# Patient Record
Sex: FEMALE | Employment: FULL TIME | URBAN - METROPOLITAN AREA
[De-identification: names, ages, dates, MRNs, and addresses within clinical notes are randomized per-mention and may not be internally consistent; named-entity substitution may affect disease eponyms.]

---

## 2017-12-19 ENCOUNTER — HOSPITAL ENCOUNTER (OUTPATIENT)
Dept: MAMMOGRAPHY | Facility: CLINIC | Age: 52
Discharge: HOME | End: 2017-12-19
Attending: OBSTETRICS & GYNECOLOGY | Admitting: OBSTETRICS & GYNECOLOGY
Payer: COMMERCIAL

## 2017-12-19 DIAGNOSIS — Z12.31 VISIT FOR SCREENING MAMMOGRAM: ICD-10-CM

## 2017-12-19 PROCEDURE — 77063 BREAST TOMOSYNTHESIS BI: CPT

## 2017-12-19 PROCEDURE — G0202 SCR MAMMO BI INCL CAD: HCPCS

## 2018-12-21 ENCOUNTER — HOSPITAL ENCOUNTER (OUTPATIENT)
Dept: MAMMOGRAPHY | Facility: CLINIC | Age: 53
Discharge: HOME | End: 2018-12-21
Attending: FAMILY MEDICINE | Admitting: FAMILY MEDICINE
Payer: COMMERCIAL

## 2018-12-21 DIAGNOSIS — Z12.31 VISIT FOR SCREENING MAMMOGRAM: ICD-10-CM

## 2018-12-21 PROCEDURE — 77063 BREAST TOMOSYNTHESIS BI: CPT

## 2020-02-19 ENCOUNTER — HOSPITAL ENCOUNTER (OUTPATIENT)
Dept: MAMMOGRAPHY | Facility: CLINIC | Age: 55
Discharge: HOME | End: 2020-02-19
Attending: FAMILY MEDICINE | Admitting: FAMILY MEDICINE
Payer: COMMERCIAL

## 2020-02-19 DIAGNOSIS — Z12.31 VISIT FOR SCREENING MAMMOGRAM: ICD-10-CM

## 2020-02-19 PROCEDURE — 77063 BREAST TOMOSYNTHESIS BI: CPT

## 2021-01-15 ENCOUNTER — HEALTH MAINTENANCE LETTER (OUTPATIENT)
Age: 56
End: 2021-01-15

## 2021-09-04 ENCOUNTER — HEALTH MAINTENANCE LETTER (OUTPATIENT)
Age: 56
End: 2021-09-04

## 2022-02-19 ENCOUNTER — HEALTH MAINTENANCE LETTER (OUTPATIENT)
Age: 57
End: 2022-02-19

## 2022-06-11 ENCOUNTER — HEALTH MAINTENANCE LETTER (OUTPATIENT)
Age: 57
End: 2022-06-11

## 2022-10-16 ENCOUNTER — HEALTH MAINTENANCE LETTER (OUTPATIENT)
Age: 57
End: 2022-10-16

## 2023-04-01 ENCOUNTER — HEALTH MAINTENANCE LETTER (OUTPATIENT)
Age: 58
End: 2023-04-01

## 2023-04-20 ENCOUNTER — TRANSCRIBE ORDERS (OUTPATIENT)
Dept: OTHER | Age: 58
End: 2023-04-20

## 2023-04-20 DIAGNOSIS — G70.00 MYASTHENIA (H): Primary | ICD-10-CM

## 2023-04-20 DIAGNOSIS — E07.9 THYROID EYE DISEASE: ICD-10-CM

## 2023-04-20 DIAGNOSIS — H53.2 DIPLOPIA: ICD-10-CM

## 2023-04-20 DIAGNOSIS — H02.401 PTOSIS, RIGHT EYELID: ICD-10-CM

## 2023-04-20 DIAGNOSIS — H57.89 THYROID EYE DISEASE: ICD-10-CM

## 2023-08-29 ENCOUNTER — TRANSCRIBE ORDERS (OUTPATIENT)
Dept: OTHER | Age: 58
End: 2023-08-29

## 2023-08-29 DIAGNOSIS — H53.2 DIPLOPIA: Primary | ICD-10-CM

## 2023-09-05 ENCOUNTER — OFFICE VISIT (OUTPATIENT)
Dept: OPHTHALMOLOGY | Facility: CLINIC | Age: 58
End: 2023-09-05
Attending: OPHTHALMOLOGY
Payer: COMMERCIAL

## 2023-09-05 DIAGNOSIS — G70.00 MYASTHENIA (H): ICD-10-CM

## 2023-09-05 DIAGNOSIS — H53.2 DIPLOPIA: ICD-10-CM

## 2023-09-05 DIAGNOSIS — H50.00 ESOTROPIA, MONOCULAR: Primary | ICD-10-CM

## 2023-09-05 DIAGNOSIS — H02.401 PTOSIS, RIGHT EYELID: ICD-10-CM

## 2023-09-05 DIAGNOSIS — E07.9 THYROID EYE DISEASE: ICD-10-CM

## 2023-09-05 DIAGNOSIS — H57.89 THYROID EYE DISEASE: ICD-10-CM

## 2023-09-05 PROCEDURE — 92285 EXTERNAL OCULAR PHOTOGRAPHY: CPT | Performed by: OPHTHALMOLOGY

## 2023-09-05 PROCEDURE — 92060 SENSORIMOTOR EXAMINATION: CPT | Performed by: OPHTHALMOLOGY

## 2023-09-05 PROCEDURE — 92060 SENSORIMOTOR EXAMINATION: CPT | Mod: 26 | Performed by: OPHTHALMOLOGY

## 2023-09-05 PROCEDURE — G0463 HOSPITAL OUTPT CLINIC VISIT: HCPCS | Performed by: OPHTHALMOLOGY

## 2023-09-05 PROCEDURE — 99204 OFFICE O/P NEW MOD 45 MIN: CPT | Mod: GC | Performed by: OPHTHALMOLOGY

## 2023-09-05 RX ORDER — SEMAGLUTIDE 1 MG/.5ML
1 INJECTION, SOLUTION SUBCUTANEOUS
COMMUNITY
Start: 2023-08-07

## 2023-09-05 RX ORDER — CETIRIZINE HYDROCHLORIDE 10 MG/1
10 TABLET ORAL DAILY
COMMUNITY

## 2023-09-05 RX ORDER — PYRIDOSTIGMINE BROMIDE 60 MG/1
120 TABLET ORAL 3 TIMES DAILY
COMMUNITY
Start: 2023-08-25

## 2023-09-05 RX ORDER — LISINOPRIL 10 MG/1
2.5 TABLET ORAL DAILY
COMMUNITY
Start: 2023-05-22

## 2023-09-05 RX ORDER — VITAMIN B COMPLEX
1 TABLET ORAL DAILY
COMMUNITY

## 2023-09-05 RX ORDER — METHIMAZOLE 5 MG/1
1 TABLET ORAL DAILY
COMMUNITY
Start: 2023-05-26

## 2023-09-05 RX ORDER — ASCORBIC ACID 500 MG
1 TABLET ORAL DAILY
COMMUNITY

## 2023-09-05 ASSESSMENT — CONF VISUAL FIELD
OS_SUPERIOR_TEMPORAL_RESTRICTION: 0
METHOD: COUNTING FINGERS
OD_INFERIOR_TEMPORAL_RESTRICTION: 0
OS_INFERIOR_TEMPORAL_RESTRICTION: 0
OS_INFERIOR_NASAL_RESTRICTION: 0
OS_NORMAL: 1
OD_INFERIOR_NASAL_RESTRICTION: 0
OS_SUPERIOR_NASAL_RESTRICTION: 0
OD_SUPERIOR_NASAL_RESTRICTION: 0
OD_SUPERIOR_TEMPORAL_RESTRICTION: 0
OD_NORMAL: 1

## 2023-09-05 ASSESSMENT — REFRACTION_WEARINGRX
OS_AXIS: 010
OD_VPRISM: 3BU
OS_SPHERE: +0.75
OD_CYLINDER: +1.25
OD_SPHERE: -0.25
OS_CYLINDER: +1.00
OD_AXIS: 130
OS_VPRISM: 1 BD
OS_HPRISM: 7BO

## 2023-09-05 ASSESSMENT — MARGIN REFLEX DISTANCE
OS_MRD1: 4
OS_MRD2: 4
OD_MRD2: 4

## 2023-09-05 ASSESSMENT — VISUAL ACUITY
METHOD: SNELLEN - LINEAR
CORRECTION_TYPE: GLASSES
CORRECTION_TYPE: GLASSES
OS_CC: 20/20
OD_CC+: +2
OD_CC+: -1
OD_CC: 20/30

## 2023-09-05 ASSESSMENT — TONOMETRY
IOP_METHOD: ICARE
OD_IOP_MMHG: 23
OS_IOP_MMHG: 21

## 2023-09-05 ASSESSMENT — EXTERNAL EXAM - LEFT EYE: OS_EXAM: NORMAL

## 2023-09-05 ASSESSMENT — LAGOPHTHALMOS
OS_LAGOPHTHALMOS: 2
OD_LAGOPHTHALMOS: 1

## 2023-09-05 ASSESSMENT — EXTERNAL EXAM - RIGHT EYE: OD_EXAM: NORMAL

## 2023-09-05 ASSESSMENT — SLIT LAMP EXAM - LIDS
COMMENTS: UL RETRACTION
COMMENTS: NORMAL

## 2023-09-05 NOTE — LETTER
2023    RE: Vaishali Fuller  : 1965  MRN: [id number]    Dear Dr. Quarles    Thank you for referring your patient, Vaishali Fuller, to our multi-disciplinary thyroid eye disease clinic recently.  After a thorough history followed by a neuro-ophthalmology, strabismus, and oculoplastics examination, we came to the following conclusions:     MULTIDISCIPLINARY THYROID EYE DISEASE CLINIC NOTE    HPI: The patient has a diagnosis of Graves () and myasthenia gravis (), for which she was treated with mestinon (ongoing) and prednisone taper (completed 2023). She experienced onset of diagonal binocular diplopia last year (2022), for which she received ground in prisms, which alleviated her subjective complaints.  Since completing her prednisone taper, she has experienced worsening of her diagonal binocular diplopia and onset of RUL retraction. She is uncertain about fluctuation in her double vision with time or with fatigue. She denies eye pain, discharge, flashes, or floaters.    Of note, has undergone 2 eyelid surgeries (BULB and ptosis repair as well as RUL ptosis revision and brow lift).     Patient had a negative single fiber electromyography testing and negative serologies for myasthenia gravis per patient.  Clinical diagnosis of myasthenia gravis made based upon resolution of ptosis with prednisone course.    Referring physician: Dr. Quarles  Thyroid history:  Diagnosed when?   PARISH: None  Thyroidectomy: None  TSI (date): n/a      Previous results: n/a    Eye symptoms (since when):   Proptosis (better/worse/same since last visit): no   Diplopia(better/worse/same since last visit): yes (initial visit)  Eyelid retraction(better/worse/same since last visit): yes (initial visit)  Tearing(better/worse/same since last visit): yes (initial visit)  Redness (better/worse/same since last visit): no  Pain (better/worse/same since last visit): no  Pain to move the eyes  (better/worse/same since last visit): no  Blurred vision: no    Ocular history:   Orbital decompression (date, details): no  Strabismus surgery (date, details): no  Eyelid surgery (date, details): no  -S/p RUL ptosis repair and brow lift (May 2022)  -S/p BULB and ptosis repair (2020)    Exam:   Paloma (base): 17/17 (91)     Better/worse same: initial visit  Strabismus (better/worse/same): Incomitant RET (initial visit)  Eyelid retraction (better/worse/same): RUL retraction (initial visit)    FAIZA Score  1. Spontaneous orbital pain.     0  2. Gaze evoked orbital pain.     0  3. Eyelid swelling due to active thyroid eye disease  0  4. Eyelid erythema.      0  5. Conjunctival redness due to active thyroid eye disease . 1  6. Chemosis.        0  7. Inflammation of caruncle OR plica.   0    FAIZA SCORE = 1/7    MRI brain scan (1/2023):  1. No evidence for acute infarction, abnormal intracranial enhancement, intracranial mass, sinusitis or mastoiditis.   2. Mild probable chronic small vessel ischemic foci involving cerebral hemispheric white matter bilaterally. No corpus callosal or posterior fossa involvement.   3. Mild asymmetric enlargement and increased enhancement/edema of the right medial rectus muscle with sparing of the tendinous insertions. 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 No evidence for associated fluid collection, nodularity or optic nerve abnormality. Inflammatory, infectious and neoplastic etiologies could also be considered however no evidence for nodularity, associated fluid collection or adjacent stranding of the orbital fat. Left orbit unremarkable.     Assessment and Plan:  OCULAR MYASTHENIA GRAVIS- on mestinon  Thyroid eye disease with restrictive esotropia and a strabismus pattern of predominately right medial rectus restriction    History of Graves disease with comorbid myasthenia gravis (s/p prednisone taper), currently managed with mestinon.   Significant incomitant RET,  likely secondary to enlarged R medial rectus muscle seen on MRI (1/2023).     FAIZA score 1/7 today    We agree with Dr. 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    If patient follows-up with us I would recommend repeat sensorimotor exam in 4 months then again in 4 months and consider offer strabismus surgery in 8 months if stable strabismus and no indication of either active thyroid eye disease or active myasthenia gravis affecting strabismus.    Discussed with the patient that strabimus repair will precede eyelid retraction repair.     Thyroid eye disease (AALIYAH).  The natural history of thyroid eye disease was discussed. We told the patient that typically AALIYAH will worsen for a period of time, then improve to some degree, and then stabilize without normalizing.  This process can take somewhere between 1 and 3 years on average.  In the meantime, we recommended seeing the patient in the [facility] every 6 months (sooner if the patient experiences worsening vision in either eye).  Once the patient becomes stable for at least 6 months' time, we discussed that the patient may need restorative surgery or prisms.  Finally, we discussed that correcting the thyroid hormone levels does not ensure that the eyes will normalize but that it could help to some degree.      The patient was asked to avoid second hand smoke.  Continue to take selenium 100 micrograms 2x/day.  The patient was told to use artificial tears as needed for dryness and irritation.  The patient was instructed to call as soon as possible if they experience visual loss or decline in either eye.     Agree with Dr. Quarles  Would continue mestinon 60 mg three times a day for now  Monitor for recurrence of ocular myasthenia gravis- may  need corticosteroid-sparing medication if recurs  Serial sensorimotor exams and consider strabismus surgery if stable for 2 visits over 8 months.    Racheal Chi MD  Oculoplastic Surgery Fellow, [facility]    35 minutes were spent on the date of the encounter by me doing chart review, history and exam, documentation, and further activities as noted above    Complete documentation of historical and exam elements from today's encounter can be found in the full encounter summary report (not reduplicated in this progress note).  I personally obtained the chief complaint(s) and history of present illness.  I confirmed and edited as necessary the review of systems, past medical/surgical history, family history, social history, and examination findings as documented by others; and I examined the patient myself.  I personally reviewed the relevant tests, images, and reports as documented above.  I formulated and edited as necessary the assessment and plan and discussed the findings and management plan with the patient and family.  I personally reviewed the ophthalmic test(s) associated with this encounter, agree with the interpretation(s) as documented by the resident/fellow, and have edited the corresponding report(s) as necessary.     Murray Hendrickson MD      Thank you for trusting us with the care of your patient.  For further exam details, please feel free to contact our office for additional records.  If you wish to contact us directly regarding this patient please email us or give our clinic a call to arrange a phone conversation.    Sincerely,    Murray Hendrickson MD  , Neuro-Ophthalmology and Adult Strabismus  Department of Ophthalmology and Visual Neurosciences  [facility]  [email or web address]    Maura Villarreal MD    Oculoplastic and Orbital Surgery   Department of Ophthalmology and Visual Neurosciences  [facility]  [email or web address]    DX:  ocular myasthenia gravis, thyroid eye disease

## 2023-09-05 NOTE — PROGRESS NOTES
MULTIDISCIPLINARY THYROID EYE DISEASE CLINIC NOTE    HPI: The patient has a diagnosis of Graves (2021) and myasthenia gravis (2022), for which she was treated with mestinon (ongoing) and prednisone taper (completed July 2023). She experienced onset of diagonal binocular diplopia last year (sept 2022), for which she received ground in prisms, which alleviated her subjective complaints.  Since completing her prednisone taper, she has experienced worsening of her diagonal binocular diplopia and onset of RUL retraction. She is uncertain about fluctuation in her double vision with time or with fatigue. She denies eye pain, discharge, flashes, or floaters.    Of note, has undergone 2 eyelid surgeries (BULB and ptosis repair as well as RUL ptosis revision and brow lift).     Patient had a negative single fiber electromyography testing and negative serologies for myasthenia gravis per patient.  Clinical diagnosis of myasthenia gravis made based upon resolution of ptosis with prednisone course.    Referring physician: Dr. Quarles    Thyroid history:  Diagnosed when? Graves, fall 2021  PARISH: None  Thyroidectomy: None    TSI (date): n/a      Previous results: n/a    Eye symptoms (since when):   Proptosis (better/worse/same since last visit): no   Diplopia(better/worse/same since last visit): yes (initial visit)  Eyelid retraction(better/worse/same since last visit): yes (initial visit)  Tearing(better/worse/same since last visit): yes (initial visit)  Redness (better/worse/same since last visit): no  Pain (better/worse/same since last visit): no  Pain to move the eyes (better/worse/same since last visit): no  Blurred vision: no    Ocular history:   Orbital decompression (date, details): no  Strabismus surgery (date, details): no  Eyelid surgery (date, details): no  -S/p RUL ptosis repair and brow lift (May 2022)  -S/p BULB and ptosis repair (2020)    Exam:   Paloma (base): 17/17 (91)     Better/worse same: initial  visit  Strabismus (better/worse/same): Incomitant RET (initial visit)  Eyelid retraction (better/worse/same): RUL retraction (initial visit)    FAIZA Score  1. Spontaneous orbital pain.     0  2. Gaze evoked orbital pain.     0  3. Eyelid swelling due to active thyroid eye disease  0  4. Eyelid erythema.      0  5. Conjunctival redness due to active thyroid eye disease . 1  6. Chemosis.        0  7. Inflammation of caruncle OR plica.   0    FAIZA SCORE = 1/7    MRI brain scan (1/2023):  1. No evidence for acute infarction, abnormal intracranial enhancement, intracranial mass, sinusitis or mastoiditis.   2. Mild probable chronic small vessel ischemic foci involving cerebral hemispheric white matter bilaterally. No corpus callosal or posterior fossa involvement.   3. Mild asymmetric enlargement and increased enhancement/edema of the right medial rectus muscle with sparing of the tendinous insertions. 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 No evidence for associated fluid collection, nodularity or optic nerve abnormality. Inflammatory, infectious and neoplastic etiologies could also be considered however no evidence for nodularity, associated fluid collection or adjacent stranding of the orbital fat. Left orbit unremarkable.     Assessment and Plan:  OCULAR MYASTHENIA GRAVIS- on mestinon  Thyroid eye disease with restrictive esotropia and a strabismus pattern of predominately right medial rectus restriction    History of Graves disease with comorbid myasthenia gravis (s/p prednisone taper), currently managed with mestinon.   Significant incomitant RET, likely secondary to enlarged R medial rectus muscle seen on MRI (1/2023).     FAIZA score 1/7 today    We agree with Dr. Quarles's assessment of concomitant ocular myasthenia gravis and thyroid eye disease and the plan to consider strabismus surgery once she has stable sensorimotor exam and proptosis and does not appear to have active thyroid eye disease for  at least 8 months.    If patient follows-up with us I would recommend repeat sensorimotor exam in 4 months then again in 4 months and consider offer strabismus surgery in 8 months if stable strabismus and no indication of either active thyroid eye disease or active myasthenia gravis affecting strabismus.    Discussed with the patient that strabimus repair will precede eyelid retraction repair.     Thyroid eye disease (AALIYAH).  The natural history of thyroid eye disease was discussed. We told the patient that typically AALIYAH will worsen for a period of time, then improve to some degree, and then stabilize without normalizing.  This process can take somewhere between 1 and 3 years on average.  In the meantime, we recommended seeing the patient in the [facility] every 6 months (sooner if the patient experiences worsening vision in either eye).  Once the patient becomes stable for at least 6 months' time, we discussed that the patient may need restorative surgery or prisms.  Finally, we discussed that correcting the thyroid hormone levels does not ensure that the eyes will normalize but that it could help to some degree.      The patient was asked to avoid second hand smoke.  Continue to take selenium 100 micrograms 2x/day.  The patient was told to use artificial tears as needed for dryness and irritation.  The patient was instructed to call as soon as possible if they experience visual loss or decline in either eye.     Agree with Dr. Quarles  Would continue mestinon 60 mg three times a day for now  Monitor for recurrence of ocular myasthenia gravis- may need corticosteroid-sparing medication if recurs  Serial sensorimotor exams and consider strabismus surgery if stable for 2 visits over 8 months.      Racheal Chi MD  Oculoplastic Surgery Fellow, [facility]    35 minutes were spent on the date of the encounter by me doing chart review, history and exam, documentation, and further  activities as noted above    Complete documentation of historical and exam elements from today's encounter can be found in the full encounter summary report (not reduplicated in this progress note).  I personally obtained the chief complaint(s) and history of present illness.  I confirmed and edited as necessary the review of systems, past medical/surgical history, family history, social history, and examination findings as documented by others; and I examined the patient myself.  I personally reviewed the relevant tests, images, and reports as documented above.  I formulated and edited as necessary the assessment and plan and discussed the findings and management plan with the patient and family.  I personally reviewed the ophthalmic test(s) associated with this encounter, agree with the interpretation(s) as documented by the resident/fellow, and have edited the corresponding report(s) as necessary.     Murray Hendrickson MD

## 2024-01-13 ENCOUNTER — HEALTH MAINTENANCE LETTER (OUTPATIENT)
Age: 59
End: 2024-01-13

## 2024-02-09 ENCOUNTER — TRANSCRIBE ORDERS (OUTPATIENT)
Dept: CALL CENTER | Age: 59
End: 2024-02-09
Payer: COMMERCIAL

## 2024-02-09 DIAGNOSIS — H53.2 DIPLOPIA: Primary | ICD-10-CM

## 2024-04-02 NOTE — PROGRESS NOTES
Initial visit HPI from thyroid eye disease clinic:  The patient has a diagnosis of Graves (2021) and myasthenia gravis (2022), for which she was treated with mestinon (ongoing) and prednisone taper (completed July 2023). She experienced onset of diagonal binocular diplopia last year (sept 2022), for which she received ground in prisms, which alleviated her subjective complaints.  Since completing her prednisone taper, she has experienced worsening of her diagonal binocular diplopia and onset of RUL retraction. She is uncertain about fluctuation in her double vision with time or with fatigue. She denies eye pain, discharge, flashes, or floaters.    Of note, has undergone 2 eyelid surgeries (BULB and ptosis repair as well as RUL ptosis revision and brow lift).     Patient had a negative single fiber electromyography testing and negative serologies for myasthenia gravis per patient.  Clinical diagnosis of myasthenia gravis made based upon resolution of ptosis with prednisone course.    MRI brain scan (1/2023):  1. No evidence for acute infarction, abnormal intracranial enhancement, intracranial mass, sinusitis or mastoiditis.   2. Mild probable chronic small vessel ischemic foci involving cerebral hemispheric white matter bilaterally. No corpus callosal or posterior fossa involvement.   3. Mild asymmetric enlargement and increased enhancement/edema of the right medial rectus muscle with sparing of the tendinous insertions. 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 No evidence for associated fluid collection, nodularity or optic nerve abnormality. Inflammatory, infectious and neoplastic etiologies could also be considered however no evidence for nodularity, associated fluid collection or adjacent stranding of the orbital fat. Left orbit unremarkable.     Interval hx since last visit with [facility] team (thyroid eye disease clinic) 9/5/2023:     Patient has been following with Dr. Quarles  but has continued worsening of double vision and increasing need for larger temporary prisms. Her double vision is constant now. Patient states that her thyroid levels have been stable. She doesn't really notice fluctuations in double vision throughout the day.  She denies any drooping of her eyelids.     Thyroid history:  Diagnosed when? Graves, fall 2021  PARISH: None  Thyroidectomy: None    TSI (date): n/a      Previous results: n/a    Eye symptoms (since when):   Proptosis (better/worse/same since last visit): no  Diplopia(better/worse/same since last visit): yes   Eyelid retraction(better/worse/same since last visit): yes   Tearing(better/worse/same since last visit): yes   Redness (better/worse/same since last visit): yes  Pain (better/worse/same since last visit): no  Pain to move the eyes (better/worse/same since last visit): no  Blurred vision: Yes    Ocular history:   Orbital decompression (date, details): no  Strabismus surgery (date, details): no  Eyelid surgery (date, details): no  -S/p RUL ptosis repair and brow lift (May 2022)  -S/p BULB and ptosis repair (2020)    Exam:   Paloma (base): 17/15.5 (90)     Better/worse same: better  Strabismus (better/worse/same): commitant ET  worse (much larger today 4/3/24 than 9/5/23)  Eyelid retraction (better/worse/same): RUL retraction improved    FAIZA Score  1. Spontaneous orbital pain.     0  2. Gaze evoked orbital pain.     0  3. Eyelid swelling due to active thyroid eye disease  0  4. Eyelid erythema.      0  5. Conjunctival redness due to active thyroid eye disease . 0  6. Chemosis.        1  7. Inflammation of caruncle OR plica.   0    8. Increase of > 2mm in proptosis.    0   9. Decrease in uniocular excursion in any direction of > 8 . 1  10. Decrease of acuity equivalent to 1 Snellen line.  0    FAIZA SCORE = 2/10    Assessment and Plan:  Probable OCULAR MYASTHENIA GRAVIS- on mestinon 120 mg three times a day  Thyroid eye disease with restrictive esotropia and  a strabismus pattern of bilateral medial rectus restriction and bilateral inferior rectus restriction    History of Graves disease with comorbid antibody negative and single fiber electromyography negative probable myasthenia gravis (s/p prednisone taper), currently managed with mestinon.     Today the patient returns with dramatically worsened alternating esotropia compared to our last exam 09/2023 and has the appearance SUGGESTIVE OF inactive thyroid eye disease with a FAIZA score of 2/10.  The two primary questions here are: 1. Is her myasthenia gravis either inactive or completely controlled by her mestinon?  2. Is her thyroid eye disease inactive?  Before I can offer her strabismus surgery for her thyroid eye disease related strabismus I need to ensure she does not have an active component of myasthenia gravis contributing to her measurements on exam and that her thyroid eye disease is inactive.  These are not questions that can be answered in a single clinic visit unfortunately.  The patient is VERY unhappy with her vision and was pushing me today for strabismus surgery thinking this would cure her of her debilitating double vision.    I advised her I will need to see stability on her sensorimotor exam over a minimum of a 4 month period from today. I'll also check a CT orbits because I would like to see if her extraocular muscle morphology fits the strabismus pattern seen on exam today (bilateral up gaze restriction and abduction restriction).  Her strabismus pattern suggests bilateral inferior rectus and bilateral medial rectus restriction from thyroid eye disease and in that case we should see prominent thickening of these muscles on CT (and with progression as compared to her prior MRI in 01/2023 at which time her strabismus was much more mild). If these changes are NOT present on CT then it would raise significant concern in my mind that myasthenia gravis remains an active component to her manifest  strabismus.      Plan:  Continue to take selenium 100 micrograms 2x/day.   Continue mestinon 60 mg three times a day for now  Obtain CT Orbit WITHOUT Contrast   Monitor for recurrence of ocular myasthenia gravis- may need corticosteroid-sparing medication if recurs  Serial sensorimotor exams  Return to clinic in approximately 4 months or sooner as needed for symptoms to suggest myasthenia gravis exacerbation (discussed these with patient in detail)    We may consider offering strabismus surgery next visit if CT matches thyroid eye disease AND sensorimotor exam remains stable AND no other clues indicating active myasthenia gravis.         Jessica Jensen MD   Fellow, Neuro-Ophthalmology     35 minutes were spent on the date of the encounter by me doing chart review, history and exam, documentation, and further activities as noted above    Complete documentation of historical and exam elements from today's encounter can be found in the full encounter summary report (not reduplicated in this progress note).  I personally obtained the chief complaint(s) and history of present illness.  I confirmed and edited as necessary the review of systems, past medical/surgical history, family history, social history, and examination findings as documented by others; and I examined the patient myself.  I personally reviewed the relevant tests, images, and reports as documented above.  I formulated and edited as necessary the assessment and plan and discussed the findings and management plan with the patient and family.  I personally reviewed the ophthalmic test(s) associated with this encounter, agree with the interpretation(s) as documented by the resident/fellow, and have edited the corresponding report(s) as necessary.     Murray Hendrickson MD

## 2024-04-03 ENCOUNTER — OFFICE VISIT (OUTPATIENT)
Dept: OPHTHALMOLOGY | Facility: CLINIC | Age: 59
End: 2024-04-03
Attending: OPHTHALMOLOGY
Payer: COMMERCIAL

## 2024-04-03 DIAGNOSIS — H50.05 ALTERNATING ESOTROPIA: ICD-10-CM

## 2024-04-03 DIAGNOSIS — H53.2 DOUBLE VISION: Primary | ICD-10-CM

## 2024-04-03 DIAGNOSIS — H53.2 DIPLOPIA: ICD-10-CM

## 2024-04-03 DIAGNOSIS — H53.10 SUBJECTIVE VISUAL DISTURBANCE: ICD-10-CM

## 2024-04-03 PROCEDURE — 92060 SENSORIMOTOR EXAMINATION: CPT

## 2024-04-03 PROCEDURE — 92060 SENSORIMOTOR EXAMINATION: CPT | Performed by: OPHTHALMOLOGY

## 2024-04-03 PROCEDURE — 99214 OFFICE O/P EST MOD 30 MIN: CPT | Performed by: OPHTHALMOLOGY

## 2024-04-03 PROCEDURE — 99214 OFFICE O/P EST MOD 30 MIN: CPT | Mod: GC | Performed by: OPHTHALMOLOGY

## 2024-04-03 PROCEDURE — 92060 SENSORIMOTOR EXAMINATION: CPT | Mod: 26 | Performed by: OPHTHALMOLOGY

## 2024-04-03 ASSESSMENT — CONF VISUAL FIELD
OS_SUPERIOR_TEMPORAL_RESTRICTION: 0
OS_INFERIOR_NASAL_RESTRICTION: 0
OD_INFERIOR_NASAL_RESTRICTION: 0
OD_SUPERIOR_TEMPORAL_RESTRICTION: 0
OS_NORMAL: 1
OS_SUPERIOR_NASAL_RESTRICTION: 0
OD_NORMAL: 1
METHOD: COUNTING FINGERS
OD_INFERIOR_TEMPORAL_RESTRICTION: 0
OS_INFERIOR_TEMPORAL_RESTRICTION: 0
OD_SUPERIOR_NASAL_RESTRICTION: 0

## 2024-04-03 ASSESSMENT — REFRACTION_WEARINGRX
OD_HBASE: OUT
OD_AXIS: 134
OS_HBASE: OUT
OS_AXIS: 010
SPECS_TYPE: OTC READERS
OD_SPHERE: +2.50
SPECS_TYPE: SVL DISTANCE ONLY
OS_SPHERE: +0.75
OS_AXIS: 005
OD_ADD: +2.00
OD_VPRISM: 2.0
OD_VBASE: UP
OD_SPHERE: -0.25
OS_VPRISM: 1.0
OS_CYLINDER: SPHERE
OS_CYLINDER: +1.00
OS_HPRISM: 7.0
OS_VBASE: DOWN
OD_CYLINDER: +1.25
OD_AXIS: 135
OD_HPRISM: 7.0
OS_SPHERE: +2.50
OS_CYLINDER: +1.00
OD_SPHERE: -0.25
OS_SPHERE: +0.75
OD_CYLINDER: SPHERE
OD_CYLINDER: +1.25

## 2024-04-03 ASSESSMENT — VISUAL ACUITY
OD_PH_CC: 20/25
OD_CC: 20/40
OS_CC: 20/25
METHOD: SNELLEN - LINEAR
OD_PH_CC+: -1
OS_CC+: +3
OD_CC+: +1
CORRECTION_TYPE: GLASSES

## 2024-04-03 ASSESSMENT — EXTERNAL EXAM - LEFT EYE: OS_EXAM: NORMAL

## 2024-04-03 ASSESSMENT — TONOMETRY
IOP_METHOD: ICARE
OS_IOP_MMHG: 16
OD_IOP_MMHG: 15

## 2024-04-03 ASSESSMENT — MARGIN REFLEX DISTANCE
OD_MRD1: 5
OS_MRD1: 5

## 2024-04-03 ASSESSMENT — CUP TO DISC RATIO
OD_RATIO: 0.35
OS_RATIO: 0.35

## 2024-04-03 ASSESSMENT — EXTERNAL EXAM - RIGHT EYE: OD_EXAM: NORMAL

## 2024-04-03 NOTE — NURSING NOTE
Chief Complaint(s) and History of Present Illness(es)       Diplopia Follow-Up    In both eyes.  Associated symptoms include blurred vision.  Negative for eye pain and headaches.             Comments    Follow up for Graves and MG.  Last visit with Dr. Hendrickson 09/05/23, has also been followed by Dr. Quarles.  Taking Mestinon 120 mg three times daily.  Patient will be switching eye care over to Dr. Hendrickson.  At her last visit with Dr. Quarles (03/19/24), she was given a Fresnel prism 30pd FATOUMATA RE over her single vision distance glasses - this has been helping with her double vision.  She seems to struggle with near work and can't seem to focus. Left eye has been sensitive to pressure, especially when wiping her eyes.  She has dry eyes and have been using art tears prn and ointment at bedtime. Floaters that are on and off.  Says her thyroid has not been stabilized overtime, but she would like to know if surgery is an option.  Have tried LP filter on sunglasses with Dr. Quarles but she could not tolerate it, she felt claustrophobic.  Currently has Fresnel 40pd FATOUMATA RE over non-prism sunglasses.   MATTHEW Stock 4/3/2024 1:19 PM

## 2024-04-03 NOTE — LETTER
2024    RE: Vaishali Fuller  : 1965  MRN: [id number]    Dear Providers,    I saw our mutual patient, Vaishali Fuller, in follow-up in my clinic recently.  After a thorough neuro-ophthalmic history and examination, I came to the following conclusions:     Initial visit HPI from thyroid eye disease clinic:  The patient has a diagnosis of Graves () and myasthenia gravis (), for which she was treated with mestinon (ongoing) and prednisone taper (completed 2023). She experienced onset of diagonal binocular diplopia last year (2022), for which she received ground in prisms, which alleviated her subjective complaints.  Since completing her prednisone taper, she has experienced worsening of her diagonal binocular diplopia and onset of RUL retraction. She is uncertain about fluctuation in her double vision with time or with fatigue. She denies eye pain, discharge, flashes, or floaters.    Of note, has undergone 2 eyelid surgeries (BULB and ptosis repair as well as RUL ptosis revision and brow lift).     Patient had a negative single fiber electromyography testing and negative serologies for myasthenia gravis per patient.  Clinical diagnosis of myasthenia gravis made based upon resolution of ptosis with prednisone course.    MRI brain scan (2023):  1. No evidence for acute infarction, abnormal intracranial enhancement, intracranial mass, sinusitis or mastoiditis.   2. Mild probable chronic small vessel ischemic foci involving cerebral hemispheric white matter bilaterally. No corpus callosal or posterior fossa involvement.   3. Mild asymmetric enlargement and increased enhancement/edema of the right medial rectus muscle with sparing of the tendinous insertions. 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 No evidence for associated fluid collection, nodularity or optic nerve abnormality. Inflammatory, infectious and neoplastic etiologies could also be considered however no  evidence for nodularity, associated fluid collection or adjacent stranding of the orbital fat. Left orbit unremarkable.     Interval hx since last visit with [facility] team (thyroid eye disease clinic) 9/5/2023:     Patient has been following with Dr. Quarles but has continued worsening of double vision and increasing need for larger temporary prisms. Her double vision is constant now. Patient states that her thyroid levels have been stable. She doesn't really notice fluctuations in double vision throughout the day.  She denies any drooping of her eyelids.     Thyroid history:  Diagnosed when? Graves, fall 2021  PARISH: None  Thyroidectomy: None    TSI (date): n/a      Previous results: n/a    Eye symptoms (since when):   Proptosis (better/worse/same since last visit): no  Diplopia(better/worse/same since last visit): yes   Eyelid retraction(better/worse/same since last visit): yes   Tearing(better/worse/same since last visit): yes   Redness (better/worse/same since last visit): yes  Pain (better/worse/same since last visit): no  Pain to move the eyes (better/worse/same since last visit): no  Blurred vision: Yes    Ocular history:   Orbital decompression (date, details): no  Strabismus surgery (date, details): no  Eyelid surgery (date, details): no  -S/p RUL ptosis repair and brow lift (May 2022)  -S/p BULB and ptosis repair (2020)    Exam:   Paloma (base): 17/15.5 (90)     Better/worse same: better  Strabismus (better/worse/same): commitant ET  worse (much larger today 4/3/24 than 9/5/23)  Eyelid retraction (better/worse/same): RUL retraction improved    FAIZA Score  1. Spontaneous orbital pain.     0  2. Gaze evoked orbital pain.     0  3. Eyelid swelling due to active thyroid eye disease  0  4. Eyelid erythema.      0  5. Conjunctival redness due to active thyroid eye disease . 0  6. Chemosis.        1  7. Inflammation of caruncle OR plica.   0    8. Increase of > 2mm in proptosis.    0   9. Decrease in  uniocular excursion in any direction of > 8 . 1  10. Decrease of acuity equivalent to 1 Snellen line.  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     Today the patient returns with dramatically worsened alternating esotropia compared to our last exam 09/2023 and has the appearance SUGGESTIVE OF inactive thyroid eye disease with a FAIZA score of 2/10.  The two primary questions here are: 1. Is her myasthenia gravis either inactive or completely controlled by her mestinon?  2. Is her thyroid eye disease inactive?  Before I can offer her strabismus surgery for her thyroid eye disease related strabismus I need to ensure she does not have an active component of myasthenia gravis contributing to her measurements on exam and that her thyroid eye disease is inactive.  These are not questions that can be answered in a single clinic visit unfortunately.  The patient is VERY unhappy with her vision and was pushing me today for strabismus surgery thinking this would cure her of her debilitating double vision.    I advised her I will need to see stability on her sensorimotor exam over a minimum of a 4 month period from today. I'll also check a CT orbits because I would like to see if her extraocular muscle morphology fits the strabismus pattern seen on exam today (bilateral up gaze restriction and abduction restriction).  Her strabismus pattern suggests bilateral inferior rectus and bilateral medial rectus restriction from thyroid eye disease and in that case we should see prominent thickening of these muscles on CT (and  with progression as compared to her prior MRI in 01/2023 at which time her strabismus was much more mild). If these changes are NOT present on CT then it would raise significant concern in my mind that myasthenia gravis remains an active component to her manifest strabismus.      Plan:  Continue to take selenium 100 micrograms 2x/day.   Continue mestinon 60 mg three times a day for now  Obtain CT Orbit WITHOUT Contrast   Monitor for recurrence of ocular myasthenia gravis- may need corticosteroid-sparing medication if recurs  Serial sensorimotor exams  Return to clinic in approximately 4 months or sooner as needed for symptoms to suggest myasthenia gravis exacerbation (discussed these with patient in detail)    We may consider offering strabismus surgery next visit if CT matches thyroid eye disease AND sensorimotor exam remains stable AND no other clues indicating active myasthenia gravis.      For further exam details, please feel free to contact our office for additional records.  If you wish to contact me regarding this patient please email me at [facility] [email or web address] or give my clinic a call to arrange a phone conversation.    Sincerely,    Murray Hendrickson MD  , Neuro-Ophthalmology and Adult Strabismus Surgery  [facility] in Neuro-Ophthalmology  Department of Ophthalmology and Visual Neurosciences  [facility]

## 2024-04-16 ENCOUNTER — HOSPITAL ENCOUNTER (OUTPATIENT)
Dept: CT IMAGING | Facility: CLINIC | Age: 59
Discharge: HOME | End: 2024-04-16
Attending: OPHTHALMOLOGY | Admitting: OPHTHALMOLOGY
Payer: COMMERCIAL

## 2024-04-16 DIAGNOSIS — H50.05 ALTERNATING ESOTROPIA: ICD-10-CM

## 2024-04-16 DIAGNOSIS — H53.2 DIPLOPIA: ICD-10-CM

## 2024-04-16 PROCEDURE — 70480 CT ORBIT/EAR/FOSSA W/O DYE: CPT

## 2024-05-29 ENCOUNTER — OFFICE VISIT (OUTPATIENT)
Dept: OPHTHALMOLOGY | Facility: CLINIC | Age: 59
End: 2024-05-29
Attending: OPHTHALMOLOGY
Payer: COMMERCIAL

## 2024-05-29 DIAGNOSIS — H50.05 ALTERNATING ESOTROPIA: Primary | ICD-10-CM

## 2024-05-29 PROCEDURE — 99212 OFFICE O/P EST SF 10 MIN: CPT | Performed by: OPHTHALMOLOGY

## 2024-05-29 PROCEDURE — 92060 SENSORIMOTOR EXAMINATION: CPT | Mod: 26 | Performed by: OPHTHALMOLOGY

## 2024-05-29 PROCEDURE — 92060 SENSORIMOTOR EXAMINATION: CPT | Performed by: OPHTHALMOLOGY

## 2024-05-29 PROCEDURE — 99213 OFFICE O/P EST LOW 20 MIN: CPT | Performed by: OPHTHALMOLOGY

## 2024-05-29 PROCEDURE — 92060 SENSORIMOTOR EXAMINATION: CPT

## 2024-05-29 ASSESSMENT — REFRACTION_WEARINGRX
OS_SPHERE: +2.50
OD_AXIS: 135
OD_SPHERE: +2.50
OS_VBASE: DOWN
OD_HPRISM: 7.0
SPECS_TYPE: SVL DISTANCE ONLY
OD_SPHERE: -0.25
OS_CYLINDER: +1.00
OS_AXIS: 010
OD_CYLINDER: SPHERE
OS_HPRISM: 7.0
OD_CYLINDER: +1.25
OS_AXIS: 005
OD_VPRISM: 2.0
OS_HBASE: OUT
OS_SPHERE: +0.75
OD_SPHERE: -0.25
OD_HBASE: OUT
OS_CYLINDER: SPHERE
OD_CYLINDER: +1.25
OD_ADD: +2.00
OD_AXIS: 134
OS_VPRISM: 1.0
OS_CYLINDER: +1.00
SPECS_TYPE: OTC READERS
OS_SPHERE: +0.75
OD_VBASE: UP

## 2024-05-29 ASSESSMENT — TONOMETRY
IOP_METHOD: ICARE SINGLE JC
OD_IOP_MMHG: 18
OS_IOP_MMHG: 17

## 2024-05-29 ASSESSMENT — EXTERNAL EXAM - RIGHT EYE: OD_EXAM: NORMAL

## 2024-05-29 ASSESSMENT — VISUAL ACUITY
OD_CC+: -2
OD_CC: 20/30
METHOD: SNELLEN - LINEAR
OS_CC: 20/20
OD_PH_CC+: -2
OD_PH_CC: 20/20

## 2024-05-29 ASSESSMENT — CONF VISUAL FIELD
OS_INFERIOR_NASAL_RESTRICTION: 0
OD_NORMAL: 1
OS_SUPERIOR_NASAL_RESTRICTION: 0
OD_SUPERIOR_TEMPORAL_RESTRICTION: 0
OD_SUPERIOR_NASAL_RESTRICTION: 0
OS_INFERIOR_TEMPORAL_RESTRICTION: 0
OS_SUPERIOR_TEMPORAL_RESTRICTION: 0
OS_NORMAL: 1
OD_INFERIOR_TEMPORAL_RESTRICTION: 0
METHOD: COUNTING FINGERS
OD_INFERIOR_NASAL_RESTRICTION: 0

## 2024-05-29 ASSESSMENT — CUP TO DISC RATIO
OD_RATIO: 0.35
OS_RATIO: 0.35

## 2024-05-29 ASSESSMENT — EXTERNAL EXAM - LEFT EYE: OS_EXAM: NORMAL

## 2024-05-29 NOTE — PROGRESS NOTES
Initial visit HPI from thyroid eye disease clinic:  The patient has a diagnosis of Graves (2021) and myasthenia gravis (2022), for which she was treated with mestinon (ongoing) and prednisone taper (completed July 2023). She experienced onset of diagonal binocular diplopia last year (sept 2022), for which she received ground in prisms, which alleviated her subjective complaints.  Since completing her prednisone taper, she has experienced worsening of her diagonal binocular diplopia and onset of RUL retraction. She is uncertain about fluctuation in her double vision with time or with fatigue. She denies eye pain, discharge, flashes, or floaters.    Of note, has undergone 2 eyelid surgeries (BULB and ptosis repair as well as RUL ptosis revision and brow lift).     Patient had a negative single fiber electromyography testing and negative serologies for myasthenia gravis per patient.  Clinical diagnosis of myasthenia gravis made based upon resolution of ptosis with prednisone course.    MRI brain scan (1/2023):  1. No evidence for acute infarction, abnormal intracranial enhancement, intracranial mass, sinusitis or mastoiditis.   2. Mild probable chronic small vessel ischemic foci involving cerebral hemispheric white matter bilaterally. No corpus callosal or posterior fossa involvement.   3. Mild asymmetric enlargement and increased enhancement/edema of the right medial rectus muscle with sparing of the tendinous insertions. 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 No evidence for associated fluid collection, nodularity or optic nerve abnormality. Inflammatory, infectious and neoplastic etiologies could also be considered however no evidence for nodularity, associated fluid collection or adjacent stranding of the orbital fat. Left orbit unremarkable.     CT orbits WO Contrast  (4/16/2024):  1.  Mild asymmetric right extraocular muscle enlargement, consistent with history of thyroid eye disease and  not significantly changed since 01/17/2023 allowing for differences in technique.  2.  Right maxillary sinus fluid and mucosal thickening, suggestive of acute sinusitis in the appropriate clinical setting. Occlusion of the right ostiomeatal unit.    Interval hx since last visit with me 4/3/2024:   Patient is no longer following. Double vision is still constant and horizontal/oblique not sure if its worse/better from last visit. She is uncertain if her double vision is worse in the morning vs. Night but she believes it to be inconsistent. No drooping of her eyelids.     Has not had thyroid levels checked since 2/2024 but plans to have them checked again at primary care visit in June.    No medication changes. Still taking 60 mg TID of mestinon. selenium 100 micrograms 2x/dayTaking levothyroxine consistently.     Thyroid history:  Diagnosed when? Graves, fall 2021  PARISH: None  Thyroidectomy: None    TSI (date): n/a      Previous results: n/a    Eye symptoms (since when):   Proptosis (better/worse/same since last visit): no  Diplopia(better/worse/same since last visit): same since last visit  Eyelid retraction(better/worse/same since last visit): same since last visit  Tearing(better/worse/same since last visit): better  Redness (better/worse/same since last visit): same  Pain (better/worse/same since last visit): no  Pain to move the eyes (better/worse/same since last visit): no  Blurred vision: Yes    Ocular history:   Orbital decompression (date, details): no  Strabismus surgery (date, details): no  Eyelid surgery (date, details): no  -S/p RUL ptosis repair and brow lift (May 2022)  -S/p BULB and ptosis repair (2020)    Exam:   Paloma (base):  22/18 (90)   Better/worse same: worse  Strabismus (better/worse/same): commitant ET  better (much larger today 5/29/24 than 4/3/24)  Eyelid retraction (better/worse/same): RUL retraction improved    FAIZA Score  1. Spontaneous orbital pain.     0  2. Gaze evoked orbital  pain.     0  3. Eyelid swelling due to active thyroid eye disease  0  4. Eyelid erythema.      0  5. Conjunctival redness due to active thyroid eye disease . 0  6. Chemosis.        0  7. Inflammation of caruncle OR plica.   0    8. Increase of > 2mm in proptosis.    0   9. Decrease in uniocular excursion in any direction of > 8 . 1  10. Decrease of acuity equivalent to 1 Snellen line.  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     Last visit (04/2024) the patient returned with dramatically worsened alternating esotropia compared to our prior exam 09/2023 and had the appearance SUGGESTIVE OF inactive thyroid eye disease with a FAIZA score of 2/10.   Today her sensorimotor examination remains stable demonstrating an approximate 35-40 prism diopter alternating esotropia.    The patient came back early today because she had concerns about her vision being blurry.  She was still correctable to 20/20 in both eyes today and had normal Ishihara color plates as well as healthy appearing optic nerve heads hence I am not concerned about this thyroid optic neuropathy at this time.  I suspect her blurry vision is eye exposure/dry eye related.    Her recent CT scan was performed and did not show dramatic changes in the extraocular muscle appearance as compared to her MRI.    The patient is going to return to see me in 4 months and we will repeat exam at that time.  We may try her on a course of steroids to see if her strabismus changes as a means to identify whether there is any active component of myasthenia gravis  before we proceed with strabismus surgery.  Ultimately it is very likely that this patient is going to require strabismus surgery I just want to ensure that we are operating on thyroid eye disease related strabismus only and not any active myasthenia related ophthalmoplegia/strabismus.  The patient requested that we not trial corticosteroids at this time due to an upcoming family event.    For now she will continue to take Mestinon 60 mg 3 times a day.         Joelle Ghosh, MS3    25 minutes were spent on the date of the encounter by me doing chart review, history and exam, documentation, and further activities as noted above    Complete documentation of historical and exam elements from today's encounter can be found in the full encounter summary report (not reduplicated in this progress note).  I personally re-obtained the chief complaint(s) and history of present illness.  I confirmed and edited as necessary the review of systems, past medical/surgical history, family history, social history, and examination findings as documented by others; and I examined the patient myself.  I personally reviewed the relevant tests, images, and reports as documented above.  I formulated and edited as necessary the assessment and plan and discussed the findings and management plan with the patient and family     A medical student was involved in the care of the patient. I was present with the medical student who participated in the service and in the documentation of the note. I have  verified the history and personally performed the physical exam and medical decision making. I extensively reviewed and edited when necessary the assessment and plan. I agree with the assessment and plan of care as documented in the note    Murray Hendrickson MD

## 2024-05-29 NOTE — PROGRESS NOTES
The patient has a diagnosis of Graves (2021) and myasthenia gravis (2022),   for which she was treated with mestinon (ongoing) and prednisone taper   (completed July 2023). She experienced onset of diagonal binocular   diplopia last year (sept 2022), for which she received ground in prisms,   which alleviated her subjective complaints.  Since completing her   prednisone taper, she has experienced worsening of her diagonal binocular   diplopia and onset of RUL retraction. She is uncertain about fluctuation   in her double vision with time or with fatigue. She denies eye pain,   discharge, flashes, or floaters.    Of note, has undergone 2 eyelid surgeries (BULB and ptosis repair as well   as RUL ptosis revision and brow lift).     Patient had a negative single fiber electromyography testing and negative   serologies for myasthenia gravis per patient.  Clinical diagnosis of   myasthenia gravis made based upon resolution of ptosis with prednisone   course.    MRI brain scan (1/2023):  1. No evidence for acute infarction, abnormal intracranial enhancement,   intracranial mass, sinusitis or mastoiditis.   2. Mild probable chronic small vessel ischemic foci involving cerebral   hemispheric white matter bilaterally. No corpus callosal or posterior   fossa involvement.   3. Mild asymmetric enlargement and increased enhancement/edema of the   right medial rectus muscle with sparing of the tendinous insertions.   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 No evidence for   associated fluid collection, nodularity or optic nerve abnormality.   Inflammatory, infectious and neoplastic etiologies could also be   considered however no evidence for nodularity, associated fluid collection   or adjacent stranding of the orbital fat. Left orbit unremarkable.     Interval hx since last visit with [facility] team (thyroid   eye disease clinic) 9/5/2023:     Patient has been following with Dr. Quarles but has  continued worsening of   double vision and increasing need for larger temporary prisms. Her double   vision is constant now. Patient states that her thyroid levels have been   stable. She doesn't really notice fluctuations in double vision throughout   the day.  She denies any drooping of her eyelids.     Thyroid history:  Diagnosed when? Graves, fall 2021  PARISH: None  Thyroidectomy: None    TSI (date): n/a      Previous results: n/a    Eye symptoms (since when):   Proptosis (better/worse/same since last visit): no  Diplopia(better/worse/same since last visit): yes   Eyelid retraction(better/worse/same since last visit): yes   Tearing(better/worse/same since last visit): yes   Redness (better/worse/same since last visit): yes  Pain (better/worse/same since last visit): no  Pain to move the eyes (better/worse/same since last visit): no  Blurred vision: Yes    Ocular history:   Orbital decompression (date, details): no  Strabismus surgery (date, details): no  Eyelid surgery (date, details): no  -S/p RUL ptosis repair and brow lift (May 2022)  -S/p BULB and ptosis repair (2020)    Exam:   Paloma (base): 17/15.5 (90)     Better/worse same: better  Strabismus (better/worse/same): commitant ET  worse (much larger today   4/3/24 than 9/5/23)  Eyelid retraction (better/worse/same): RUL retraction improved    FAIZA Score  1. Spontaneous orbital pain.     0  2. Gaze evoked orbital pain.     0  3. Eyelid swelling due to active thyroid eye disease  0  4. Eyelid erythema.      0  5. Conjunctival redness due to active thyroid eye disease . 0  6. Chemosis.        1  7. Inflammation of caruncle OR plica.   0    8. Increase of > 2mm in proptosis.    0   9. Decrease in uniocular excursion in any direction of > 8 . 1  10. Decrease of acuity equivalent to 1 Snellen line.  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     Today the patient returns with dramatically worsened alternating esotropia   compared to our last exam 09/2023 and has the appearance SUGGESTIVE OF   inactive thyroid eye disease with a FAIZA score of 2/10.  The two primary   questions here are: 1. Is her myasthenia gravis either inactive or   completely controlled by her mestinon?  2. Is her thyroid eye disease   inactive?  Before I can offer her strabismus surgery for her thyroid eye   disease related strabismus I need to ensure she does not have an active   component of myasthenia gravis contributing to her measurements on exam   and that her thyroid eye disease is inactive.  These are not questions   that can be answered in a single clinic visit unfortunately.  The patient   is VERY unhappy with her vision and was pushing me today for strabismus   surgery thinking this would cure her of her debilitating double vision.    I advised her I will need to see stability on her sensorimotor exam over a   minimum of a 4 month period from today. I'll also check a CT orbits   because I would like to see if her extraocular muscle morphology fits the   strabismus pattern seen on exam today (bilateral up gaze restriction and   abduction restriction).  Her strabismus pattern suggests bilateral   inferior rectus and bilateral medial rectus restriction from thyroid eye   disease and in that case we should see prominent thickening of these   muscles on CT (and with progression as compared to her prior MRI in   01/2023 at which time her strabismus was much more mild). If these changes   are NOT present on CT then it would raise significant concern in my mind   that myasthenia gravis  remains an active component to her manifest   strabismus.      Plan:  Continue to take selenium 100 micrograms 2x/day.   Continue mestinon 60 mg three times a day for now  Obtain CT Orbit WITHOUT Contrast   Monitor for recurrence of ocular myasthenia gravis- may need   corticosteroid-sparing medication if recurs  Serial sensorimotor exams  Return to clinic in approximately 4 months or sooner as needed for   symptoms to suggest myasthenia gravis exacerbation (discussed these with   patient in detail)    We may consider offering strabismus surgery next visit if CT matches   thyroid eye disease AND sensorimotor exam remains stable AND no other   clues indicating active myasthenia gravis.      Interim history and exam with me since last visit 4/3/2024          ***

## 2024-08-21 ENCOUNTER — OFFICE VISIT (OUTPATIENT)
Dept: OPHTHALMOLOGY | Facility: CLINIC | Age: 59
End: 2024-08-21
Attending: OPHTHALMOLOGY
Payer: COMMERCIAL

## 2024-08-21 DIAGNOSIS — H50.05 ALTERNATING ESOTROPIA: ICD-10-CM

## 2024-08-21 DIAGNOSIS — G70.00 MYASTHENIA (H): Primary | ICD-10-CM

## 2024-08-21 DIAGNOSIS — H53.10 SUBJECTIVE VISUAL DISTURBANCE: ICD-10-CM

## 2024-08-21 DIAGNOSIS — H53.2 DOUBLE VISION: ICD-10-CM

## 2024-08-21 PROCEDURE — 92060 SENSORIMOTOR EXAMINATION: CPT | Performed by: OPHTHALMOLOGY

## 2024-08-21 PROCEDURE — 99214 OFFICE O/P EST MOD 30 MIN: CPT | Mod: GC | Performed by: OPHTHALMOLOGY

## 2024-08-21 PROCEDURE — 92060 SENSORIMOTOR EXAMINATION: CPT

## 2024-08-21 PROCEDURE — 92060 SENSORIMOTOR EXAMINATION: CPT | Mod: 26 | Performed by: OPHTHALMOLOGY

## 2024-08-21 PROCEDURE — 99214 OFFICE O/P EST MOD 30 MIN: CPT | Performed by: OPHTHALMOLOGY

## 2024-08-21 RX ORDER — PREDNISONE 10 MG/1
TABLET ORAL
Qty: 168 TABLET | Refills: 0 | Status: SHIPPED | OUTPATIENT
Start: 2024-08-21 | End: 2024-10-02

## 2024-08-21 ASSESSMENT — REFRACTION_WEARINGRX
OD_AXIS: 135
SPECS_TYPE: SVL DISTANCE ONLY
OD_SPHERE: -0.25
OD_SPHERE: +2.50
OD_HBASE: OUT
OS_AXIS: 005
OS_VPRISM: 1.0
OS_CYLINDER: +1.00
SPECS_TYPE: OTC READERS
OS_SPHERE: +2.50
OS_CYLINDER: SPHERE
OS_HPRISM: 7.0
OS_VBASE: DOWN
OD_CYLINDER: SPHERE
OD_VBASE: UP
OD_HPRISM: 7.0
OS_SPHERE: +0.75
OD_CYLINDER: +1.25
OS_HBASE: OUT
OD_VPRISM: 2.0

## 2024-08-21 ASSESSMENT — CONF VISUAL FIELD
OS_SUPERIOR_TEMPORAL_RESTRICTION: 0
OS_INFERIOR_NASAL_RESTRICTION: 0
OS_NORMAL: 1
METHOD: COUNTING FINGERS
OD_NORMAL: 1
OD_INFERIOR_TEMPORAL_RESTRICTION: 0
OD_SUPERIOR_TEMPORAL_RESTRICTION: 0
OD_INFERIOR_NASAL_RESTRICTION: 0
OS_INFERIOR_TEMPORAL_RESTRICTION: 0
OD_SUPERIOR_NASAL_RESTRICTION: 0
OS_SUPERIOR_NASAL_RESTRICTION: 0

## 2024-08-21 ASSESSMENT — VISUAL ACUITY
OS_CC: 20/30
CORRECTION_TYPE: GLASSES
OD_CC: 20/40
OD_PH_CC+: +1
OS_PH_CC: 20/30
OS_PH_CC+: +3
OS_CC+: -1
OD_PH_CC: 20/25
OD_CC+: -2
METHOD: SNELLEN - LINEAR

## 2024-08-21 ASSESSMENT — MARGIN REFLEX DISTANCE
OD_MRD1: 5
OS_MRD1: 6.5

## 2024-08-21 ASSESSMENT — EXTERNAL EXAM - LEFT EYE: OS_EXAM: NORMAL,

## 2024-08-21 ASSESSMENT — CUP TO DISC RATIO
OS_RATIO: 0.35
OD_RATIO: 0.35

## 2024-08-21 ASSESSMENT — EXTERNAL EXAM - RIGHT EYE: OD_EXAM: NORMAL

## 2024-08-21 ASSESSMENT — TONOMETRY
OD_IOP_MMHG: 16
OS_IOP_MMHG: 14
IOP_METHOD: ICARE

## 2024-08-21 NOTE — NURSING NOTE
Chief Complaint(s) and History of Present Illness(es)       Diplopia Follow-Up    In both eyes.  Since onset it is stable.  Associated symptoms include blurred vision.  Context includes:  thyroid disease and myasthenia gravis. Additional comments: 4 month follow-up. Currently on Mestinon 120 mg three times daily.  Has been having dry eyes, used some eye ointment last night and eyes feel good today.  Left upper eyelid retracts and does not fully close when sleeping. Has been having more difficulty with reading - will have to wear reading glasses over distance Rx with a magnification glass.  Still wearing the Fresnel prism 40pd FATOUMATA RE - doing well without diplopia.  MATTHEW Stock 8/21/2024 10:07 AM

## 2024-08-21 NOTE — PATIENT INSTRUCTIONS
Please take 20 mg daily of prednisone for 1 week then take 30 mg daily for 1 week, then take 40 mg daily for 1 week, then take 50 mg daily until follow up.

## 2024-08-21 NOTE — LETTER
2024    RE: Vaishali Fuller  : 1965  MRN: [id number]    Dear Providers,    I saw our mutual patient, Vaishali Fuller, in follow-up in my clinic recently.  After a thorough neuro-ophthalmic history and examination, I came to the following conclusions:     Initial visit HPI from thyroid eye disease clinic:  The patient has a diagnosis of Graves () and myasthenia gravis (), for which she was treated with mestinon (ongoing) and prednisone taper (completed 2023). She experienced onset of diagonal binocular diplopia last year (2022), for which she received ground in prisms, which alleviated her subjective complaints.  Since completing her prednisone taper, she has experienced worsening of her diagonal binocular diplopia and onset of RUL retraction. She is uncertain about fluctuation in her double vision with time or with fatigue. She denies eye pain, discharge, flashes, or floaters.    Of note, has undergone 2 eyelid surgeries (BULB and ptosis repair as well as RUL ptosis revision and brow lift).     Patient had a negative single fiber electromyography testing and negative serologies for myasthenia gravis per patient.  Clinical diagnosis of myasthenia gravis made based upon resolution of ptosis with prednisone course.    MRI brain scan (2023):  1. No evidence for acute infarction, abnormal intracranial enhancement, intracranial mass, sinusitis or mastoiditis.   2. Mild probable chronic small vessel ischemic foci involving cerebral hemispheric white matter bilaterally. No corpus callosal or posterior fossa involvement.   3. Mild asymmetric enlargement and increased enhancement/edema of the right medial rectus muscle with sparing of the tendinous insertions. 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 No evidence for associated fluid collection, nodularity or optic nerve abnormality. Inflammatory, infectious and neoplastic etiologies could also be considered however no  evidence for nodularity, associated fluid collection or adjacent stranding of the orbital fat. Left orbit unremarkable.     CT orbits WO Contrast  (4/16/2024):  1.  Mild asymmetric right extraocular muscle enlargement, consistent with history of thyroid eye disease and not significantly changed since 01/17/2023 allowing for differences in technique.  2.  Right maxillary sinus fluid and mucosal thickening, suggestive of acute sinusitis in the appropriate clinical setting. Occlusion of the right ostiomeatal unit.    Interval hx since last visit with me 5/29/2024:   Her diplopia is the same as last visit. She has noticed the left eyelid is more retracted which has led to worsening dry eye. She is currently using artificial tears up to several times an hour, ointment before bed and in the morning and an eye mask overnight. Double vision is still constant and horizontal/oblique and the same.     Has not had thyroid levels checked since 2/2024 but plans to have them checked again at primary care visit in June.    Still taking 120 mg TID of mestinon. selenium 100 micrograms 2x/dayTaking levothyroxine consistently.     Thyroid history:  Diagnosed when? Graves, fall 2021  PARISH: None  Thyroidectomy: None    TSI (date): n/a      Previous results: n/a    Eye symptoms (since when): 5/29/24  Proptosis (better/worse/same since last visit): no  Diplopia(better/worse/same since last visit): same since last visit  Eyelid retraction(better/worse/same since last visit): left UL worse  Tearing(better/worse/same since last visit): worse  Redness (better/worse/same since last visit): same  Pain (better/worse/same since last visit): no  Pain to move the eyes (better/worse/same since last visit): no  Blurred vision: Yes    Ocular history:   Orbital decompression (date, details): no  Strabismus surgery (date, details): no  Eyelid surgery (date, details): no  -S/p RUL ptosis repair and brow lift (May 2022)  -S/p BULB and ptosis repair  (2020)    Exam:   Paloma (base):  17/16 (90)   Better/worse same: same-better (may be dependent upon which eye is fixating)  Strabismus (better/worse/same): commitant ET same as last visit   Eyelid retraction (better/worse/same): YIFAN retraction same     FAIZA Score  1. Spontaneous orbital pain.     0  2. Gaze evoked orbital pain.     0  3. Eyelid swelling due to active thyroid eye disease  0  4. Eyelid erythema.      0  5. Conjunctival redness due to active thyroid eye disease . 0  6. Chemosis.        1  7. Inflammation of caruncle OR plica.   0    8. Increase of > 2mm in proptosis.    0   9. Decrease in uniocular excursion in any direction of > 8 . 0  10. Decrease of acuity equivalent to 1 Snellen line.  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     04/2024 the patient returned with dramatically worsened alternating esotropia compared to our prior exam 09/2023 and had the appearance SUGGESTIVE of inactive thyroid eye disease with a FAIZA score of 2/10.   Today her sensorimotor examination remains stable demonstrating an approximate 35-40 prism diopter alternating esotropia stable from last visit in May.     4/16/24 CT scan was performed and did not show dramatic changes in the extraocular muscle appearance as compared to her MRI.    Her sensorimotor exam is stable today however given changes in eyelid position suggesting MG (significant lag in the left eye that is new with severe orbic weakness) would still like to trial prednisone to uncover any potential MG contributing to strabismus prior to surgery. If  measurements stable on prednisone can start surgical planning.    Plan:  Prednisone 20 mg daily x 1 week then 30 mg daily x 1 week then 40 mg daily x 1 week then 50 mg daily until follow-up with me in about 5-6 weeks.  Depending on response may then may begin slow taper off  Consider strabismus surgery in the future  May need corticosteroid-sparing medication depending on how she does when we taper her off  Patient sees Dr. Viera (Neurology) in past at [facility] in addition to Dr. Quarles.    Advised patient to start over the counter gastrointestinal anti-acid prophylaxis such as nexium, prilosec, zantac, pepcid.  The patient should take this once daily while on oral prednisone to minimize risk of peptic ulcers and GI bleeding.  The patient expressed understanding and agreed to purchase and take this medication accordingly.      For further exam details, please feel free to contact our office for additional records.  If you wish to contact me regarding this patient please email me at [email or web address] or give my clinic a call to arrange a phone conversation.    Sincerely,    Murray Hendrickson MD  , Neuro-Ophthalmology and Adult Strabismus Surgery  [facility] in Neuro-Ophthalmology  Department of Ophthalmology and Visual Neurosciences  [facility]

## 2024-08-21 NOTE — PROGRESS NOTES
Initial visit HPI from thyroid eye disease clinic:  The patient has a diagnosis of Graves (2021) and myasthenia gravis (2022), for which she was treated with mestinon (ongoing) and prednisone taper (completed July 2023). She experienced onset of diagonal binocular diplopia last year (sept 2022), for which she received ground in prisms, which alleviated her subjective complaints.  Since completing her prednisone taper, she has experienced worsening of her diagonal binocular diplopia and onset of RUL retraction. She is uncertain about fluctuation in her double vision with time or with fatigue. She denies eye pain, discharge, flashes, or floaters.    Of note, has undergone 2 eyelid surgeries (BULB and ptosis repair as well as RUL ptosis revision and brow lift).     Patient had a negative single fiber electromyography testing and negative serologies for myasthenia gravis per patient.  Clinical diagnosis of myasthenia gravis made based upon resolution of ptosis with prednisone course.    MRI brain scan (1/2023):  1. No evidence for acute infarction, abnormal intracranial enhancement, intracranial mass, sinusitis or mastoiditis.   2. Mild probable chronic small vessel ischemic foci involving cerebral hemispheric white matter bilaterally. No corpus callosal or posterior fossa involvement.   3. Mild asymmetric enlargement and increased enhancement/edema of the right medial rectus muscle with sparing of the tendinous insertions. 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 No evidence for associated fluid collection, nodularity or optic nerve abnormality. Inflammatory, infectious and neoplastic etiologies could also be considered however no evidence for nodularity, associated fluid collection or adjacent stranding of the orbital fat. Left orbit unremarkable.     CT orbits WO Contrast  (4/16/2024):  1.  Mild asymmetric right extraocular muscle enlargement, consistent with history of thyroid eye disease and  not significantly changed since 01/17/2023 allowing for differences in technique.  2.  Right maxillary sinus fluid and mucosal thickening, suggestive of acute sinusitis in the appropriate clinical setting. Occlusion of the right ostiomeatal unit.    Interval hx since last visit with me 5/29/2024:   Her diplopia is the same as last visit. She has noticed the left eyelid is more retracted which has led to worsening dry eye. She is currently using artificial tears up to several times an hour, ointment before bed and in the morning and an eye mask overnight. Double vision is still constant and horizontal/oblique and the same.     Has not had thyroid levels checked since 2/2024 but plans to have them checked again at primary care visit in June.    Still taking 120 mg TID of mestinon. selenium 100 micrograms 2x/dayTaking levothyroxine consistently.     Thyroid history:  Diagnosed when? Graves, fall 2021  PARISH: None  Thyroidectomy: None    TSI (date): n/a      Previous results: n/a    Eye symptoms (since when): 5/29/24  Proptosis (better/worse/same since last visit): no  Diplopia(better/worse/same since last visit): same since last visit  Eyelid retraction(better/worse/same since last visit): left UL worse  Tearing(better/worse/same since last visit): worse  Redness (better/worse/same since last visit): same  Pain (better/worse/same since last visit): no  Pain to move the eyes (better/worse/same since last visit): no  Blurred vision: Yes    Ocular history:   Orbital decompression (date, details): no  Strabismus surgery (date, details): no  Eyelid surgery (date, details): no  -S/p RUL ptosis repair and brow lift (May 2022)  -S/p BULB and ptosis repair (2020)    Exam:   Paloma (base):  17/16 (90)   Better/worse same: same-better (may be dependent upon which eye is fixating)  Strabismus (better/worse/same): commitant ET same as last visit   Eyelid retraction (better/worse/same): YIFAN retraction same     FAIZA Score  1.  Spontaneous orbital pain.     0  2. Gaze evoked orbital pain.     0  3. Eyelid swelling due to active thyroid eye disease  0  4. Eyelid erythema.      0  5. Conjunctival redness due to active thyroid eye disease . 0  6. Chemosis.        1  7. Inflammation of caruncle OR plica.   0    8. Increase of > 2mm in proptosis.    0   9. Decrease in uniocular excursion in any direction of > 8 . 0  10. Decrease of acuity equivalent to 1 Snellen line.  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     04/2024 the patient returned with dramatically worsened alternating esotropia compared to our prior exam 09/2023 and had the appearance SUGGESTIVE of inactive thyroid eye disease with a FAIZA score of 2/10.   Today her sensorimotor examination remains stable demonstrating an approximate 35-40 prism diopter alternating esotropia stable from last visit in May.     4/16/24 CT scan was performed and did not show dramatic changes in the extraocular muscle appearance as compared to her MRI.    Her sensorimotor exam is stable today however given changes in eyelid position suggesting MG (significant lag in the left eye that is new with severe orbic weakness) would still like to trial prednisone to uncover any potential MG contributing to strabismus prior to surgery. If measurements stable on prednisone can start surgical planning.    Plan:  Prednisone 20 mg daily x 1 week then 30 mg daily x 1 week then 40 mg daily x 1 week then 50 mg daily until follow-up with me in about 5-6 weeks.  Depending on response may then may begin slow  taper off  Consider strabismus surgery in the future  May need corticosteroid-sparing medication depending on how she does when we taper her off  Patient sees Dr. Viera (Neurology) in past at [facility] in addition to Dr. Quarles.    Advised patient to start over the counter gastrointestinal anti-acid prophylaxis such as nexium, prilosec, zantac, pepcid.  The patient should take this once daily while on oral prednisone to minimize risk of peptic ulcers and GI bleeding.  The patient expressed understanding and agreed to purchase and take this medication accordingly.         Bebo Goldsmith MD  Resident Physician, PGY-3  Department of Ophthalmology    35 minutes were spent on the date of the encounter by me doing chart review, history and exam, documentation, and further activities as noted above    Complete documentation of historical and exam elements from today's encounter can be found in the full encounter summary report (not reduplicated in this progress note).  I personally obtained the chief complaint(s) and history of present illness.  I confirmed and edited as necessary the review of systems, past medical/surgical history, family history, social history, and examination findings as documented by others; and I examined the patient myself.  I personally reviewed the relevant tests, images, and reports as documented above.  I formulated and edited as necessary the assessment and plan and discussed the findings and management plan with the patient and family.  I personally reviewed the ophthalmic test(s) associated with this encounter, agree with the interpretation(s) as documented by the resident/fellow, and have edited the corresponding report(s) as necessary.     Murray Hendrickson MD

## 2024-10-02 ENCOUNTER — OFFICE VISIT (OUTPATIENT)
Dept: OPHTHALMOLOGY | Facility: CLINIC | Age: 59
End: 2024-10-02
Attending: OPHTHALMOLOGY
Payer: COMMERCIAL

## 2024-10-02 DIAGNOSIS — H50.22 HYPERTROPIA OF LEFT EYE: ICD-10-CM

## 2024-10-02 DIAGNOSIS — H50.05 ALTERNATING ESOTROPIA: ICD-10-CM

## 2024-10-02 DIAGNOSIS — H53.2 DOUBLE VISION: Primary | ICD-10-CM

## 2024-10-02 DIAGNOSIS — H53.10 SUBJECTIVE VISUAL DISTURBANCE: ICD-10-CM

## 2024-10-02 DIAGNOSIS — G70.00 MYASTHENIA (H): ICD-10-CM

## 2024-10-02 PROCEDURE — 99214 OFFICE O/P EST MOD 30 MIN: CPT | Performed by: OPHTHALMOLOGY

## 2024-10-02 PROCEDURE — 92060 SENSORIMOTOR EXAMINATION: CPT

## 2024-10-02 PROCEDURE — 92060 SENSORIMOTOR EXAMINATION: CPT | Mod: 26 | Performed by: OPHTHALMOLOGY

## 2024-10-02 PROCEDURE — 92060 SENSORIMOTOR EXAMINATION: CPT | Performed by: OPHTHALMOLOGY

## 2024-10-02 RX ORDER — PREDNISONE 10 MG/1
TABLET ORAL
Qty: 30 TABLET | Refills: 0 | Status: SHIPPED | OUTPATIENT
Start: 2024-10-02 | End: 2024-10-14

## 2024-10-02 ASSESSMENT — REFRACTION_WEARINGRX
OD_HPRISM: 7.0
OD_AXIS: 135
OD_CYLINDER: SPHERE
OS_AXIS: 005
OD_VBASE: UP
OS_HPRISM: 7.0
OS_VPRISM: 1.0
SPECS_TYPE: SVL DISTANCE ONLY
OS_SPHERE: +0.75
OD_HBASE: OUT
OD_SPHERE: -0.25
OS_VBASE: DOWN
OS_CYLINDER: SPHERE
OD_VPRISM: 2.0
OS_HBASE: OUT
SPECS_TYPE: OTC READERS
OS_CYLINDER: +1.00
OD_CYLINDER: +1.25
OD_SPHERE: +2.50
OS_SPHERE: +2.50

## 2024-10-02 ASSESSMENT — CONF VISUAL FIELD
OS_SUPERIOR_TEMPORAL_RESTRICTION: 0
METHOD: COUNTING FINGERS
OD_SUPERIOR_NASAL_RESTRICTION: 0
OD_INFERIOR_NASAL_RESTRICTION: 0
OD_INFERIOR_TEMPORAL_RESTRICTION: 0
OS_INFERIOR_TEMPORAL_RESTRICTION: 0
OD_SUPERIOR_TEMPORAL_RESTRICTION: 0
OS_NORMAL: 1
OS_INFERIOR_NASAL_RESTRICTION: 0
OS_SUPERIOR_NASAL_RESTRICTION: 0
OD_NORMAL: 1

## 2024-10-02 ASSESSMENT — TONOMETRY
IOP_METHOD: ICARE
OD_IOP_MMHG: 16
OS_IOP_MMHG: 17

## 2024-10-02 ASSESSMENT — VISUAL ACUITY
OD_PH_CC+: -2
OD_PH_CC: 20/20
METHOD: SNELLEN - LINEAR
OD_CC+: -2
CORRECTION_TYPE: GLASSES
OD_CC: 20/40
OS_CC: 20/25
OS_CC+: +1

## 2024-10-02 NOTE — PROGRESS NOTES
History of probable OCULAR MYASTHENIA GRAVIS- on mestinon 120 mg three times a day  Thyroid eye disease with restrictive esotropia and a strabismus pattern of bilateral medial rectus restriction and bilateral inferior rectus restriction    Last visit it remained unclear how much of her strabismus might be related to active / undertreated myasthenia gravis. She started on an escalating dose of prednisone at 20 mg daily on 8/21/24  increasing 10 mg daily every week up to 50 mg daily which she is on currently.     Today her sensorimotor exam / strabismus is unchanged indicating it is very likely that her strabismus is entirely thyroid eye disease related.     Patient currently on 50 mg daily of Prednisone. Decrease by 10 mg daily every 3 days until off. May stop antiacid once off prednisone.     We discussed strabismus surgery today and she is eager to re-establish binocularity. After discussion of the risks, benefits, and alternatives of surgery she wishes to proceed.       Initial visit HPI from thyroid eye disease clinic:  The patient has a diagnosis of Graves (2021) and myasthenia gravis (2022), for which she was treated with mestinon (ongoing) and prednisone taper (completed July 2023). She experienced onset of diagonal binocular diplopia last year (sept 2022), for which she received ground in prisms, which alleviated her subjective complaints.  Since completing her prednisone taper, she has experienced worsening of her diagonal binocular diplopia and onset of RUL retraction. She is uncertain about fluctuation in her double vision with time or with fatigue. She denies eye pain, discharge, flashes, or floaters.    Of note, has undergone 2 eyelid surgeries (BULB and ptosis repair as well as RUL ptosis revision and brow lift).     Patient had a negative single fiber electromyography testing and negative serologies for myasthenia gravis per patient.  Clinical diagnosis of myasthenia gravis made based upon  resolution of ptosis with prednisone course.    04/2024 the patient returned with dramatically worsened alternating esotropia compared to our prior exam 09/2023 and had the appearance SUGGESTIVE of inactive thyroid eye disease with a FAIZA score of 2/10.   Today her sensorimotor examination remains stable demonstrating an approximate 35-40 prism diopter alternating esotropia stable from last visit in May.     4/16/24 CT scan was performed and did not show dramatic changes in the extraocular muscle appearance as compared to her MRI.    MRI brain scan (1/2023):  1. No evidence for acute infarction, abnormal intracranial enhancement, intracranial mass, sinusitis or mastoiditis.   2. Mild probable chronic small vessel ischemic foci involving cerebral hemispheric white matter bilaterally. No corpus callosal or posterior fossa involvement.   3. Mild asymmetric enlargement and increased enhancement/edema of the right medial rectus muscle with sparing of the tendinous insertions. 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 No evidence for associated fluid collection, nodularity or optic nerve abnormality. Inflammatory, infectious and neoplastic etiologies could also be considered however no evidence for nodularity, associated fluid collection or adjacent stranding of the orbital fat. Left orbit unremarkable.     CT orbits WO Contrast  (4/16/2024):  1.  Mild asymmetric right extraocular muscle enlargement, consistent with history of thyroid eye disease and not significantly changed since 01/17/2023 allowing for differences in technique.  2.  Right maxillary sinus fluid and mucosal thickening, suggestive of acute sinusitis in the appropriate clinical setting. Occlusion of the right ostiomeatal unit.      Interval hx since last visit with me 8/21/2024:   Can only function with the glasses with the grid in the right eye lens. Still experiencing constant double vision that is horizontal/oblique. No change in diplopia  without her glasses off. Experiences no diplopia with glasses on. Left eyelid is still very retracted with no change since last visit on prednisone. Left eye does not shut completely. No change in dry eye. Still using artificial tears up to several times an hour, ointment before bed and in the morning, and an eye mask overnight. Still taking 120mg TID of mestinon, selenium 100 micrograms 2x/dayTaking levothyroxine consistently.  Currently taking 50 mg prednisone. She has gained 10lbs since starting prednisone. Notes her appetite has increased. No trouble sleeping.    Last thyroid  levels checked 8/13/24: TSH 0.48, Free T3 2.9, Free T4 0.9.    Exam:  Her sensorimotor exam is stable today.     We discussed in detail the risks, benefits, and alternatives of eye muscle correction surgery including the very rare risk of death or serious morbidity from a general anesthesia complication and the rare risk of severe vision loss in the operative eye(s) secondary to retinal detachment or endophthalmitis.  We discussed more likely sub-optimal outcomes including the unanticipated need for additional strabismus surgery.  Finally the patient was aware that prisms glasses may be required to optimize single vision following surgery.    After a thorough discussion of these risks, the patient decided to proceed with strabismus surgery.  The surgical plan is as follows:     1. Bilateral eye muscle correction with possible use of adjustable suture.    Plan for bilateral medial rectus recessions- one on adjustable. And right inferior rectus recession (likely on fixed suture)    Follow-up 1 week after strabismus surgery.    Plan [facility] at: [facility]  Prism free glasses for adjustment: patient will need prism free glasses for day of surgery.        Complete documentation of historical and exam elements from today's encounter can be found in the full encounter summary report (not reduplicated in this progress note).  I personally obtained the  chief complaint(s) and history of present illness.  I confirmed and edited as necessary the review of systems, past medical/surgical history, family history, social history, and examination findings as documented by others; and I examined the patient myself.  I personally reviewed the relevant tests, images, and reports as documented above.  I formulated and edited as necessary the assessment and plan and discussed the findings and management plan with the patient and family     Murray Hendrickson MD    35 minutes were spent on the date of the encounter by me doing chart review, history and exam, documentation, and further activities as noted above

## 2024-10-31 ASSESSMENT — EXTERNAL EXAM - LEFT EYE: OS_EXAM: NORMAL,

## 2024-10-31 ASSESSMENT — EXTERNAL EXAM - RIGHT EYE: OD_EXAM: NORMAL

## 2024-11-29 ENCOUNTER — ANESTHESIA EVENT (OUTPATIENT)
Dept: SURGERY | Facility: AMBULATORY SURGERY CENTER | Age: 59
End: 2024-11-29
Payer: COMMERCIAL

## 2024-11-29 RX ORDER — OXYCODONE HYDROCHLORIDE 5 MG/1
5 TABLET ORAL
Status: CANCELLED | OUTPATIENT
Start: 2024-11-29

## 2024-11-29 RX ORDER — FENTANYL CITRATE 50 UG/ML
25 INJECTION, SOLUTION INTRAMUSCULAR; INTRAVENOUS
Status: CANCELLED | OUTPATIENT
Start: 2024-11-29

## 2024-11-29 RX ORDER — NALOXONE HYDROCHLORIDE 0.4 MG/ML
0.1 INJECTION, SOLUTION INTRAMUSCULAR; INTRAVENOUS; SUBCUTANEOUS
Status: CANCELLED | OUTPATIENT
Start: 2024-11-29

## 2024-11-29 RX ORDER — OXYCODONE HYDROCHLORIDE 5 MG/1
10 TABLET ORAL
Status: CANCELLED | OUTPATIENT
Start: 2024-11-29

## 2024-11-29 RX ORDER — ONDANSETRON 4 MG/1
4 TABLET, ORALLY DISINTEGRATING ORAL EVERY 30 MIN PRN
Status: CANCELLED | OUTPATIENT
Start: 2024-11-29

## 2024-11-29 RX ORDER — ONDANSETRON 2 MG/ML
4 INJECTION INTRAMUSCULAR; INTRAVENOUS EVERY 30 MIN PRN
Status: CANCELLED | OUTPATIENT
Start: 2024-11-29

## 2024-11-29 RX ORDER — DEXAMETHASONE SODIUM PHOSPHATE 10 MG/ML
4 INJECTION, SOLUTION INTRAMUSCULAR; INTRAVENOUS
Status: CANCELLED | OUTPATIENT
Start: 2024-11-29

## 2024-11-29 RX ORDER — ACETAMINOPHEN 325 MG/1
975 TABLET ORAL ONCE
Status: CANCELLED | OUTPATIENT
Start: 2024-11-29 | End: 2024-11-29

## 2024-11-30 RX ORDER — PREDNISOLONE ACETATE 10 MG/ML
SUSPENSION/ DROPS OPHTHALMIC
Qty: 10 ML | Refills: 0 | Status: SHIPPED | OUTPATIENT
Start: 2024-11-30

## 2024-12-01 NOTE — ANESTHESIA PREPROCEDURE EVALUATION
"Anesthesia Pre-Procedure Evaluation    Patient: Vaishali Fuller   MRN: [id number] : 1965        Procedure : Procedure(s):  Bilateral eye muscle correction. Possible use of adjustable suture in one or both eyes.          Past Medical History:   Diagnosis Date    Arthritis     Graves disease     HTN (hypertension)       Past Surgical History:   Procedure Laterality Date    HEEL SPUR SURGERY Right 2020      No Known Allergies   Social History     Tobacco Use    Smoking status: Never    Smokeless tobacco: Never   Substance Use Topics    Alcohol use: Not on file      Wt Readings from Last 1 Encounters:   No data found for Wt           Physical Exam    Airway        Mallampati: II   TM distance: > 3 FB   Neck ROM: full   Mouth opening: > 3 cm    Respiratory Devices and Support         Dental       (+) Minor Abnormalities - some fillings, tiny chips      Cardiovascular   cardiovascular exam normal          Pulmonary   pulmonary exam normal                OUTSIDE LABS:  CBC: No results found for: \"WBC\", \"HGB\", \"HCT\", \"PLT\"  BMP: No results found for: \"NA\", \"POTASSIUM\", \"CHLORIDE\", \"CO2\", \"BUN\", \"CR\", \"GLC\"  COAGS: No results found for: \"PTT\", \"INR\", \"FIBR\"  POC: No results found for: \"BGM\", \"HCG\", \"HCGS\"  HEPATIC: No results found for: \"ALBUMIN\", \"PROTTOTAL\", \"ALT\", \"AST\", \"GGT\", \"ALKPHOS\", \"BILITOTAL\", \"BILIDIRECT\", \"SHAHNAZ\"  OTHER: No results found for: \"PH\", \"LACT\", \"A1C\", \"NAVEEN\", \"PHOS\", \"MAG\", \"LIPASE\", \"AMYLASE\", \"TSH\", \"T4\", \"T3\", \"CRP\", \"SED\"    Anesthesia Plan    ASA Status:  2    NPO Status:  NPO Appropriate    Anesthesia Type: General.     - Airway: LMA   Induction: Intravenous, Propofol.   Maintenance: Balanced.        Consents    Anesthesia Plan(s) and associated risks, benefits, and realistic alternatives discussed. Questions answered and patient/representative(s) expressed understanding.     - Discussed: Risks, Benefits and Alternatives for BOTH SEDATION and the PROCEDURE were discussed     - Discussed " with:  Patient      - Extended Intubation/Ventilatory Support Discussed: No.      - Patient is DNR/DNI Status: No     Use of blood products discussed: No .     Postoperative Care    Pain management: IV analgesics, Oral pain medications, Multi-modal analgesia.   PONV prophylaxis: Dexamethasone or Solumedrol, Ondansetron (or other 5HT-3), Background Propofol Infusion     Comments:               Cecil Almonte MD    I have reviewed the pertinent notes and labs in the chart from the past 30 days and (re)examined the patient.  Any updates or changes from those notes are reflected in this note.

## 2024-12-02 ENCOUNTER — HOSPITAL ENCOUNTER (OUTPATIENT)
Facility: AMBULATORY SURGERY CENTER | Age: 59
Discharge: HOME | End: 2024-12-02
Attending: OPHTHALMOLOGY
Payer: COMMERCIAL

## 2024-12-02 ENCOUNTER — TELEPHONE (OUTPATIENT)
Dept: OPHTHALMOLOGY | Facility: CLINIC | Age: 59
End: 2024-12-02

## 2024-12-02 ENCOUNTER — ANESTHESIA (OUTPATIENT)
Dept: SURGERY | Facility: AMBULATORY SURGERY CENTER | Age: 59
End: 2024-12-02
Payer: COMMERCIAL

## 2024-12-02 VITALS
RESPIRATION RATE: 19 BRPM | BODY MASS INDEX: 26.81 KG/M2 | SYSTOLIC BLOOD PRESSURE: 123 MMHG | WEIGHT: 142 LBS | DIASTOLIC BLOOD PRESSURE: 68 MMHG | HEIGHT: 61 IN | TEMPERATURE: 98 F | HEART RATE: 67 BPM | OXYGEN SATURATION: 98 %

## 2024-12-02 DIAGNOSIS — Z98.890 STATUS POST EYE SURGERY: Primary | ICD-10-CM

## 2024-12-02 PROCEDURE — 67311 REVISE EYE MUSCLE: CPT | Performed by: NURSE ANESTHETIST, CERTIFIED REGISTERED

## 2024-12-02 PROCEDURE — 67311 REVISE EYE MUSCLE: CPT | Mod: 50 | Performed by: OPHTHALMOLOGY

## 2024-12-02 PROCEDURE — 67332 REREVISE EYE MUSCLES ADD-ON: CPT | Mod: XS | Performed by: OPHTHALMOLOGY

## 2024-12-02 PROCEDURE — 67314 REVISE EYE MUSCLE: CPT | Mod: RT

## 2024-12-02 PROCEDURE — 67314 REVISE EYE MUSCLE: CPT | Mod: RT | Performed by: OPHTHALMOLOGY

## 2024-12-02 PROCEDURE — 67311 REVISE EYE MUSCLE: CPT | Performed by: STUDENT IN AN ORGANIZED HEALTH CARE EDUCATION/TRAINING PROGRAM

## 2024-12-02 PROCEDURE — 67311 REVISE EYE MUSCLE: CPT | Mod: RT

## 2024-12-02 PROCEDURE — 67335 EYE SUTURE DURING SURGERY: CPT | Mod: LT | Performed by: OPHTHALMOLOGY

## 2024-12-02 RX ORDER — FENTANYL CITRATE 50 UG/ML
25 INJECTION, SOLUTION INTRAMUSCULAR; INTRAVENOUS EVERY 5 MIN PRN
Status: DISCONTINUED | OUTPATIENT
Start: 2024-12-02 | End: 2024-12-03 | Stop reason: HOSPADM

## 2024-12-02 RX ORDER — LIDOCAINE 40 MG/G
CREAM TOPICAL
Status: DISCONTINUED | OUTPATIENT
Start: 2024-12-02 | End: 2024-12-02 | Stop reason: HOSPADM

## 2024-12-02 RX ORDER — KETOROLAC TROMETHAMINE 30 MG/ML
15 INJECTION, SOLUTION INTRAMUSCULAR; INTRAVENOUS EVERY 6 HOURS PRN
Status: DISCONTINUED | OUTPATIENT
Start: 2024-12-02 | End: 2024-12-03 | Stop reason: HOSPADM

## 2024-12-02 RX ORDER — SODIUM CHLORIDE, SODIUM LACTATE, POTASSIUM CHLORIDE, CALCIUM CHLORIDE 600; 310; 30; 20 MG/100ML; MG/100ML; MG/100ML; MG/100ML
INJECTION, SOLUTION INTRAVENOUS CONTINUOUS
Status: DISCONTINUED | OUTPATIENT
Start: 2024-12-02 | End: 2024-12-03 | Stop reason: HOSPADM

## 2024-12-02 RX ORDER — ONDANSETRON 4 MG/1
4 TABLET, ORALLY DISINTEGRATING ORAL EVERY 30 MIN PRN
Status: DISCONTINUED | OUTPATIENT
Start: 2024-12-02 | End: 2024-12-03 | Stop reason: HOSPADM

## 2024-12-02 RX ORDER — BALANCED SALT SOLUTION 6.4; .75; .48; .3; 3.9; 1.7 MG/ML; MG/ML; MG/ML; MG/ML; MG/ML; MG/ML
SOLUTION OPHTHALMIC PRN
Status: DISCONTINUED | OUTPATIENT
Start: 2024-12-02 | End: 2024-12-02 | Stop reason: HOSPADM

## 2024-12-02 RX ORDER — MEPERIDINE HYDROCHLORIDE 25 MG/ML
12.5 INJECTION INTRAMUSCULAR; INTRAVENOUS; SUBCUTANEOUS EVERY 5 MIN PRN
Status: DISCONTINUED | OUTPATIENT
Start: 2024-12-02 | End: 2024-12-03 | Stop reason: HOSPADM

## 2024-12-02 RX ORDER — ONDANSETRON 2 MG/ML
INJECTION INTRAMUSCULAR; INTRAVENOUS PRN
Status: DISCONTINUED | OUTPATIENT
Start: 2024-12-02 | End: 2024-12-02

## 2024-12-02 RX ORDER — TETRACAINE HYDROCHLORIDE 5 MG/ML
1 SOLUTION OPHTHALMIC
Status: DISCONTINUED | OUTPATIENT
Start: 2024-12-02 | End: 2024-12-03 | Stop reason: HOSPADM

## 2024-12-02 RX ORDER — HYDRALAZINE HYDROCHLORIDE 20 MG/ML
2.5-5 INJECTION INTRAMUSCULAR; INTRAVENOUS EVERY 10 MIN PRN
Status: DISCONTINUED | OUTPATIENT
Start: 2024-12-02 | End: 2024-12-03 | Stop reason: HOSPADM

## 2024-12-02 RX ORDER — DIPHENHYDRAMINE HCL 25 MG
25 CAPSULE ORAL EVERY 6 HOURS PRN
Status: DISCONTINUED | OUTPATIENT
Start: 2024-12-02 | End: 2024-12-03 | Stop reason: HOSPADM

## 2024-12-02 RX ORDER — FENTANYL CITRATE 50 UG/ML
50 INJECTION, SOLUTION INTRAMUSCULAR; INTRAVENOUS EVERY 5 MIN PRN
Status: DISCONTINUED | OUTPATIENT
Start: 2024-12-02 | End: 2024-12-03 | Stop reason: HOSPADM

## 2024-12-02 RX ORDER — SODIUM CHLORIDE, SODIUM LACTATE, POTASSIUM CHLORIDE, CALCIUM CHLORIDE 600; 310; 30; 20 MG/100ML; MG/100ML; MG/100ML; MG/100ML
INJECTION, SOLUTION INTRAVENOUS CONTINUOUS
Status: DISCONTINUED | OUTPATIENT
Start: 2024-12-02 | End: 2024-12-02 | Stop reason: HOSPADM

## 2024-12-02 RX ORDER — ALBUTEROL SULFATE 0.83 MG/ML
2.5 SOLUTION RESPIRATORY (INHALATION) EVERY 4 HOURS PRN
Status: DISCONTINUED | OUTPATIENT
Start: 2024-12-02 | End: 2024-12-03 | Stop reason: HOSPADM

## 2024-12-02 RX ORDER — HYDROMORPHONE HYDROCHLORIDE 1 MG/ML
0.4 INJECTION, SOLUTION INTRAMUSCULAR; INTRAVENOUS; SUBCUTANEOUS EVERY 5 MIN PRN
Status: DISCONTINUED | OUTPATIENT
Start: 2024-12-02 | End: 2024-12-03 | Stop reason: HOSPADM

## 2024-12-02 RX ORDER — DIPHENHYDRAMINE HYDROCHLORIDE 50 MG/ML
25 INJECTION INTRAMUSCULAR; INTRAVENOUS EVERY 6 HOURS PRN
Status: DISCONTINUED | OUTPATIENT
Start: 2024-12-02 | End: 2024-12-03 | Stop reason: HOSPADM

## 2024-12-02 RX ORDER — ACETAMINOPHEN 325 MG/1
975 TABLET ORAL ONCE
Status: DISCONTINUED | OUTPATIENT
Start: 2024-12-02 | End: 2024-12-03 | Stop reason: HOSPADM

## 2024-12-02 RX ORDER — HYDROMORPHONE HYDROCHLORIDE 1 MG/ML
0.2 INJECTION, SOLUTION INTRAMUSCULAR; INTRAVENOUS; SUBCUTANEOUS EVERY 5 MIN PRN
Status: DISCONTINUED | OUTPATIENT
Start: 2024-12-02 | End: 2024-12-03 | Stop reason: HOSPADM

## 2024-12-02 RX ORDER — LORAZEPAM 2 MG/ML
.5-1 INJECTION INTRAMUSCULAR
Status: DISCONTINUED | OUTPATIENT
Start: 2024-12-02 | End: 2024-12-03 | Stop reason: HOSPADM

## 2024-12-02 RX ORDER — LABETALOL HYDROCHLORIDE 5 MG/ML
10 INJECTION, SOLUTION INTRAVENOUS
Status: DISCONTINUED | OUTPATIENT
Start: 2024-12-02 | End: 2024-12-03 | Stop reason: HOSPADM

## 2024-12-02 RX ORDER — KETOROLAC TROMETHAMINE 30 MG/ML
15 INJECTION, SOLUTION INTRAMUSCULAR; INTRAVENOUS
Status: COMPLETED | OUTPATIENT
Start: 2024-12-02 | End: 2024-12-02

## 2024-12-02 RX ORDER — NALOXONE HYDROCHLORIDE 0.4 MG/ML
0.1 INJECTION, SOLUTION INTRAMUSCULAR; INTRAVENOUS; SUBCUTANEOUS
Status: DISCONTINUED | OUTPATIENT
Start: 2024-12-02 | End: 2024-12-03 | Stop reason: HOSPADM

## 2024-12-02 RX ORDER — DEXAMETHASONE SODIUM PHOSPHATE 10 MG/ML
4 INJECTION, SOLUTION INTRAMUSCULAR; INTRAVENOUS
Status: DISCONTINUED | OUTPATIENT
Start: 2024-12-02 | End: 2024-12-03 | Stop reason: HOSPADM

## 2024-12-02 RX ORDER — GLYCOPYRROLATE 0.2 MG/ML
INJECTION, SOLUTION INTRAMUSCULAR; INTRAVENOUS PRN
Status: DISCONTINUED | OUTPATIENT
Start: 2024-12-02 | End: 2024-12-02

## 2024-12-02 RX ORDER — DEXAMETHASONE SODIUM PHOSPHATE 4 MG/ML
INJECTION, SOLUTION INTRA-ARTICULAR; INTRALESIONAL; INTRAMUSCULAR; INTRAVENOUS; SOFT TISSUE PRN
Status: DISCONTINUED | OUTPATIENT
Start: 2024-12-02 | End: 2024-12-02

## 2024-12-02 RX ORDER — OXYMETAZOLINE HYDROCHLORIDE 0.05 G/100ML
SPRAY NASAL PRN
Status: DISCONTINUED | OUTPATIENT
Start: 2024-12-02 | End: 2024-12-02 | Stop reason: HOSPADM

## 2024-12-02 RX ORDER — PROPOFOL 10 MG/ML
INJECTION, EMULSION INTRAVENOUS PRN
Status: DISCONTINUED | OUTPATIENT
Start: 2024-12-02 | End: 2024-12-02

## 2024-12-02 RX ORDER — ACETAMINOPHEN 325 MG/1
975 TABLET ORAL ONCE
Status: DISCONTINUED | OUTPATIENT
Start: 2024-12-02 | End: 2024-12-02 | Stop reason: HOSPADM

## 2024-12-02 RX ORDER — ACETAMINOPHEN 325 MG/1
975 TABLET ORAL ONCE
Status: COMPLETED | OUTPATIENT
Start: 2024-12-02 | End: 2024-12-02

## 2024-12-02 RX ORDER — LIDOCAINE HYDROCHLORIDE 20 MG/ML
INJECTION, SOLUTION INFILTRATION; PERINEURAL PRN
Status: DISCONTINUED | OUTPATIENT
Start: 2024-12-02 | End: 2024-12-02

## 2024-12-02 RX ORDER — FENTANYL CITRATE 50 UG/ML
INJECTION, SOLUTION INTRAMUSCULAR; INTRAVENOUS PRN
Status: DISCONTINUED | OUTPATIENT
Start: 2024-12-02 | End: 2024-12-02

## 2024-12-02 RX ORDER — KETOROLAC TROMETHAMINE 30 MG/ML
15 INJECTION, SOLUTION INTRAMUSCULAR; INTRAVENOUS
Status: DISCONTINUED | OUTPATIENT
Start: 2024-12-02 | End: 2024-12-03 | Stop reason: HOSPADM

## 2024-12-02 RX ORDER — ONDANSETRON 2 MG/ML
4 INJECTION INTRAMUSCULAR; INTRAVENOUS EVERY 30 MIN PRN
Status: DISCONTINUED | OUTPATIENT
Start: 2024-12-02 | End: 2024-12-03 | Stop reason: HOSPADM

## 2024-12-02 RX ORDER — PROPOFOL 10 MG/ML
INJECTION, EMULSION INTRAVENOUS CONTINUOUS PRN
Status: DISCONTINUED | OUTPATIENT
Start: 2024-12-02 | End: 2024-12-02

## 2024-12-02 RX ADMIN — PROPOFOL 50 MG: 10 INJECTION, EMULSION INTRAVENOUS at 08:35

## 2024-12-02 RX ADMIN — FENTANYL CITRATE 25 MCG: 50 INJECTION, SOLUTION INTRAMUSCULAR; INTRAVENOUS at 08:35

## 2024-12-02 RX ADMIN — ACETAMINOPHEN 975 MG: 325 TABLET ORAL at 06:27

## 2024-12-02 RX ADMIN — PROPOFOL 50 MG: 10 INJECTION, EMULSION INTRAVENOUS at 08:21

## 2024-12-02 RX ADMIN — PROPOFOL 200 MCG/KG/MIN: 10 INJECTION, EMULSION INTRAVENOUS at 07:27

## 2024-12-02 RX ADMIN — TETRACAINE HYDROCHLORIDE 1 DROP: 5 SOLUTION OPHTHALMIC at 09:19

## 2024-12-02 RX ADMIN — FENTANYL CITRATE 50 MCG: 50 INJECTION, SOLUTION INTRAMUSCULAR; INTRAVENOUS at 07:35

## 2024-12-02 RX ADMIN — LIDOCAINE HYDROCHLORIDE 60 MG: 20 INJECTION, SOLUTION INFILTRATION; PERINEURAL at 07:27

## 2024-12-02 RX ADMIN — FENTANYL CITRATE 25 MCG: 50 INJECTION, SOLUTION INTRAMUSCULAR; INTRAVENOUS at 08:21

## 2024-12-02 RX ADMIN — ONDANSETRON 4 MG: 2 INJECTION INTRAMUSCULAR; INTRAVENOUS at 07:22

## 2024-12-02 RX ADMIN — DEXAMETHASONE SODIUM PHOSPHATE 4 MG: 4 INJECTION, SOLUTION INTRA-ARTICULAR; INTRALESIONAL; INTRAMUSCULAR; INTRAVENOUS; SOFT TISSUE at 07:34

## 2024-12-02 RX ADMIN — TETRACAINE HYDROCHLORIDE 1 DROP: 5 SOLUTION OPHTHALMIC at 10:05

## 2024-12-02 RX ADMIN — TETRACAINE HYDROCHLORIDE 1 DROP: 5 SOLUTION OPHTHALMIC at 09:37

## 2024-12-02 RX ADMIN — GLYCOPYRROLATE 0.4 MG: 0.2 INJECTION, SOLUTION INTRAMUSCULAR; INTRAVENOUS at 07:47

## 2024-12-02 RX ADMIN — PROPOFOL 150 MCG/KG/MIN: 10 INJECTION, EMULSION INTRAVENOUS at 08:06

## 2024-12-02 RX ADMIN — PROPOFOL 180 MG: 10 INJECTION, EMULSION INTRAVENOUS at 07:27

## 2024-12-02 RX ADMIN — ONDANSETRON 4 MG: 2 INJECTION INTRAMUSCULAR; INTRAVENOUS at 09:42

## 2024-12-02 RX ADMIN — KETOROLAC TROMETHAMINE 15 MG: 30 INJECTION, SOLUTION INTRAMUSCULAR; INTRAVENOUS at 08:48

## 2024-12-02 RX ADMIN — SODIUM CHLORIDE, SODIUM LACTATE, POTASSIUM CHLORIDE, CALCIUM CHLORIDE: 600; 310; 30; 20 INJECTION, SOLUTION INTRAVENOUS at 06:30

## 2024-12-02 NOTE — ANESTHESIA POSTPROCEDURE EVALUATION
Patient: Vaishali Fuller    Procedure: Procedure(s):  Bilateral eye muscle correction. Use of adjustable suture in left eye.       Anesthesia Type:  General    Note:  Disposition: Outpatient   Postop Pain Control: Uneventful            Sign Out: Well controlled pain   PONV: No   Neuro/Psych: Uneventful            Sign Out: Acceptable/Baseline neuro status   Airway/Respiratory: Uneventful            Sign Out: Acceptable/Baseline resp. status   CV/Hemodynamics: Uneventful            Sign Out: Acceptable CV status; No obvious hypovolemia; No obvious fluid overload   Other NRE:    DID A NON-ROUTINE EVENT OCCUR?            Last vitals:  Vitals Value Taken Time   /80 12/02/24 0930   Temp 36.6  C (97.8  F) 12/02/24 0930   Pulse 70 12/02/24 0930   Resp 16 12/02/24 0930   SpO2 98 % 12/02/24 0930       Electronically Signed By: Cecil Almonte MD  December 2, 2024  10:29 AM

## 2024-12-02 NOTE — ANESTHESIA PROCEDURE NOTES
Airway       Patient location during procedure: OR       Procedure Start/Stop Times: 12/2/2024 7:29 AM  Staff -        CRNA: Jazz Fagan APRN CRNA       Performed By: CRNAIndications and Patient Condition       Indications for airway management: zurdo-procedural       Induction type:intravenous       Mask difficulty assessment: 1 - vent by mask    Final Airway Details       Final airway type: supraglottic airway    Supraglottic Airway Details        Type: LMA       Brand: LMA Unique       LMA size: 4    Post intubation assessment        Placement verified by: capnometry, equal breath sounds and chest rise        Number of attempts at approach: 1       Number of other approaches attempted: 0       Secured with: tape       Ease of procedure: easy       Dentition: Intact and Unchanged    Medication(s) Administered   Medication Administration Time: 12/2/2024 7:29 AM

## 2024-12-02 NOTE — ADDENDUM NOTE
Addendum  created 12/02/24 1128 by Jazz Fagan APRN CRNA    Flowsheet accepted, Intraprocedure Flowsheets edited

## 2024-12-02 NOTE — BRIEF OP NOTE
[facility]    Brief Operative Note     Pre-operative diagnosis: Strabismus [H50.9]  Post-operative diagnosis Same as pre-operative diagnosis    Procedure(s):  Bilateral eye muscle correction. Use of adjustable suture in left eye.    Surgeons and Role:     * Murray Hendrickson MD - Primary     * Ryan Ferguson MD - Resident - Assisting     * Anthony Holder MD    Anesthesia: General   Estimated Blood Loss: Less than 1 ml    Drains:   None  Specimens:  * No specimens in log *  Findings:   See full operative report.  Complications:             None.  Implants:  * No implants in log *

## 2024-12-02 NOTE — ANESTHESIA CARE TRANSFER NOTE
Patient: Vaishali Fuller    Procedure: Procedure(s):  Bilateral eye muscle correction. Use of adjustable suture in left eye.       Diagnosis: Double vision [H53.2]  Diagnosis Additional Information: No value filed.    Anesthesia Type:   General     Note:    Oropharynx: oropharynx clear of all foreign objects and spontaneously breathing  Level of Consciousness: drowsy  Oxygen Supplementation: face mask  Level of Supplemental Oxygen (L/min / FiO2): 6    Dentition: dentition unchanged  Vital Signs Stable: post-procedure vital signs reviewed and stable  Report to RN Given: handoff report given  Patient transferred to: PACU  Comments: Uneventful transport   Report to RN  Exchanging well; color natl  Pt responds appropriately to command; sleepy  IV patent  Lips/teeth/dentition as preop status  Questions answered      Handoff Report: Identifed the Patient, Identified the Reponsible Provider, Reviewed the pertinent medical history, Discussed the surgical course, Reviewed Intra-OP anesthesia mangement and issues during anesthesia, Set expectations for post-procedure period and Allowed opportunity for questions and acknowledgement of understanding      Vitals:  Vitals Value Taken Time   /74 0901   Temp 97.6 0901   Pulse 72 12/02/24 0901   Resp 12 12/02/24 0901   SpO2 97 % 12/02/24 0901   Vitals shown include unfiled device data.    Electronically Signed By: BARBARA TRINIDAD CRNA  December 2, 2024  9:02 AM

## 2024-12-02 NOTE — DISCHARGE INSTRUCTIONS
[facility]  Home Care Following Anesthesia  For 24 hours after surgery:  Get plenty of rest.  A responsible adult must stay with you for at least 24 hours after you leave the surgery center.  Do not drive or use heavy equipment.  If you have weakness or tingling, don't drive or use heavy equipment until this feeling goes away.   Do not drink alcohol.   Avoid strenuous or risky activities.  Ask for help when climbing stairs.  You may feel lightheaded.  IF so, sit for a few minutes before standing.  Have someone help you get up.   If you have nausea (feel sick to your stomach): Drink only clear liquids such as apple juice, ginger ale, broth or 7-Up.  Rest may also help.  Be sure to drink enough fluids.  Move to a regular diet as you feel able.   You may have a slight fever.  Call the doctor if your fever is over 100 F (37.7 C) (taken under the tongue) or lasts longer than 24 hours.  You may have a dry mouth, a sore throat, muscle aches or trouble sleeping. These should go away after 24 hours.  Do not make important or legal decisions.   It is recommended to avoid smoking.               Tips for taking pain medications  To get the best pain relief possible, remember these points:  Take pain medications as directed, before pain becomes severe.  Pain medication can upset your stomach: taking it with food may help.  Constipation is a common side effect of pain medication. Drink plenty of  fluids.  Eat foods high in fiber. Take a stool softener if recommended by your doctor or pharmacist.  Do not drink alcohol, drive or operate machinery while taking pain medications.  Ask about other ways to control pain, such as with heat, ice or relaxation.    Tylenol/Acetaminophen Consumption    If you feel your pain relief is insufficient, you may take Tylenol/Acetaminophen in addition to your narcotic pain medication.   Be careful not to exceed 4,000 mg of Tylenol/Acetaminophen in a 24 hour  period from all sources.  If you are taking extra strength Tylenol/acetaminophen (500 mg), the maximum dose is 8 tablets in 24 hours.  If you are taking regular strength acetaminophen (325 mg), the maximum dose is 12 tablets in 24 hours.  Tylenol 975 mg given at 6:30 am.   Ok to take more after 12:30 pm.      Toradol 15 mg given at  8:50 am.  Do not take any NSAIDs for 4 hours.  OK after 12:50 pm.  (Ibuprofen, Advil, Motrin, Naproxen, Aleve).      Call a doctor for any of the following:  Signs of infection (fever, growing tenderness at the surgery site, a large amount of drainage or bleeding, severe pain, foul-smelling drainage, redness, swelling).  It has been over 8 to 10 hours since surgery and you are still not able to urinate (pass water).  Headache for over 24 hours.  Numbness, tingling or weakness the day after surgery (if you had spinal anesthesia).  Signs of Covid-19 infection (temperature over 100 degrees, shortness of breath, cough, loss of taste/smell, generalized body aches, persistent headache, chills, sore throat, nausea/vomiting/diarrhea)  Your doctor is:       Dr. Murray Hendrickson, Ophthalmology: [phone]               Or dial [phone] and ask for the resident on call for:  Ophthalmology  For emergency care, call the:  [facility]:  [phone] (TTY for hearing impaired: [phone])

## 2024-12-02 NOTE — OP NOTE
ATTENDING SURGEON:  Murray Hendrickson MD    DATE OF PROCEDURE:  December 2, 2024    FIRST SURGICAL ASSISTANT:  Anthony Martinez MD: Neuro-ophthalmology fellow     SECOND SURGICAL ASSISTANT:   Ryan Ferguson MD (PGY 3 resident)    ANESTHESIA:  General anesthesia    PREOPERATIVE DIAGNOSIS (ES):  Thyroid eye disease with restrictive strabismus   Restrictive alternating esotropia   Restrictive right hypotropia    POSTOPERATIVE DIAGNOSIS (ES):  Same    NAME OF OPERATION:  Recession of restricted, scarred right medial rectus by 7.0 mm  Recession of restricted, scarred right inferior rectus by 2.0 mm  Recession of restricted, scarred left medial rectus by 5.0 on adjustable suture. Recessed an additional approximate 3 mm during post-operative adjustment process.    INDICATIONS FOR PROCEDURE:    The patient is a pleasant 59 year old female with a past ocular history of thyroid eye disease and ocular myasthenia gravis. Over time she was monitored with serial sensorimotor exams and given a trial of treatment with corticosteroids to ensure that her strabismus was entirely thyroid eye disease related without a component of active myasthenia gravis. Once it was apparent that her current strabismus was both stable and thyroid eye disease related I offered strabismus surgery and the patient opted to proceed with surgery to allow her single binocular vision with a tolerable amount of prism.    I warned the patient about the risks, benefits, and alternatives of eye muscle surgery including a relatively small risk for severe infection, retinal detachment, and permanent vision loss of the eye.  I also discussed the possibility of postoperative double vision.  I discussed the possibility that due to postoperative double vision or a postoperative recurrent strabismus, the patient may require additional strabismus procedures in the future.  Understanding these risks, the patient decided to proceed with strabismus surgery.      DESCRIPTION OF  "PROCEDURE:    After the risks, benefits, and alternatives of eye muscle surgery were discussed with the patient and the patient's questions were answered both in clinic and on the day of surgery, written informed consent was obtained and witnessed in the preoperative holding area on the day of surgery.  The word \"yes\" was written over both eyes indicating that the patient consented to eye muscle correction in both eyes.  An intravenous line was placed and light intravenous sedation was given.  The patient was transported to the operating room where after appropriate monitors were placed, the patient underwent induction of general anesthesia without complication.      Both eyes were prepped and draped in the usual sterile fashion for ophthalmic surgery and a lid speculum was placed in the right eye.  Forced duction testing in this eye revealed moderate restriction to abduction and mild restriction to supraduction.  A trapezoidal nasal conjunctival flap was created using conjunctival forceps and Pj scissors.  This was reflected backwards to expose the scarred, restricted right medial rectus muscle which was isolated using a [place] muscle hook.  The scarred, restricted muscle was carefully freed from Tenon's capsule with blunt dissection using a Pj scissors and cotton swab.  A Moses grooved hook was then used to hold the muscle to allow sufficient space for suturing a double armed 6-0 Vicryl suture across the width of the muscle near it's insertion of the globe with locking passes at the muscle edges.  The muscle was disinserted from the globe using Pj scissors.  Both arms of the 6-0 Vicryl suture were then passed partial thickness through the sclera at a point measured to be 7.0 mm posterior to the physiologic muscle insertion using a caliper.  At this point, the ends of the suture were tied together.  The needles were cut off and the ends were cut short.     The trapezoidal nasal conjunctival flap " was extended inferiorly using conjunctival forceps and Pj scissors.  This was reflected backwards to expose the scarred, restricted right inferior rectus muscle which was isolated using a Roderfield muscle hook.  The scarred, restricted muscle was carefully freed from Tenon's capsule and the lower lid retractions with blunt dissection using a Pj scissors and cotton swab.  A Moses grooved hook was then used to hold the muscle to allow sufficient space for suturing a double armed 6-0 Vicryl suture across the width of the muscle near it's insertion of the globe with locking passes at the muscle edges.  The muscle was disinserted from the globe using Pj scissors.  Both arms of the 6-0 Vicryl suture were then passed partial thickness through the sclera at a point measured to be 2.0 mm posterior to the physiologic muscle insertion using a caliper.  At this point, the ends of the suture were tied together.  The needles were cut off and the ends were cut short.  The conjunctival flap was then re-opposed in the surgical bed and held in place using two 6-0 plain gut sutures.  The lid speculum was removed from the operative eye.     A lid speculum was placed in the left eye.  Forced duction testing in this eye revealed moderate restriction to abduction.  A trapezoidal nasal conjunctival flap was created using conjunctival forceps and Pj scissors.  This was reflected backwards to expose the scarred, restricted left medial rectus muscle which was isolated using a Vitaliy muscle hook.  The scarred, restricted muscle was carefully freed from Tenon's capsule with blunt dissection using a Pj scissors and cotton swab.   A Moses grooved hook was then used to hold the muscle to allow sufficient space for suturing a double armed 6-0 Vicryl suture across the width of the muscle near it's insertion of the globe with locking passes at the muscle edges.  The muscle was disinserted from the globe using Pj  scissors.   Both arms of the 6-0 Vicryl suture were then passed partial thickness through the sclera at the physiologic muscle insertion in a  crossing swords technique.   At this point, a 6-0 undyed Vicryl suture with needle removed was used to create a sliding  noose  knot allowing for post-operative adjustment.  A 6-0 Vicryl suture on a spatulated needle was then used to make a partial-thickness scleral bite adjacent to the operative muscle physiologic insertion to serve as a traction suture facilitating the adjustment process.  The needles were then cut off of the double armed 6-0 suture on which the extraocular muscle was recessed.  The operative muscle was then recessed 5.0 mm from its original insertion as measured with a caliper.  Viscoelastic agent was then used to provide lubrication to the noose knot so that it would slide freely.  The trapezoidal conjunctival flap was reopposed in the surgical bed and held in place with 6-0 plain gut suture using one permanent suture and one temporary suture (which would be made permanent following adjustment post-operartively).The lid speculum was removed from the operative eye.     Maxitrol ointment was placed in both eyes.  The patient was awakened from general anesthesia without complication and discharged to the recovery room in stable condition.       SPECIMENS REMOVED:  None.      ESTIMATED BLOOD LOSS:  1 mL or less.    INTRAOPERATIVE FLUIDS:  As per anesthesia records.      SPONGE/INSTRUMENT/NEEDLE COUNTS:  All sponge, instrument, and needle counts were correct times two.    CONDITION ON DISCHARGE FROM OPERATING ROOM:  Stable.      COMPLICATIONS:  None.     I was present for the entire procedure    POST-OPERATIVE ADJUSTMENT    After awakening from anesthesia sufficiently to communicate clearly and fixate on a target consistent, the adjustment process began.  Tetracaine topical anesthetic was placed in both eyes and steri-strips holding down the temporary sutures  were removed from the face.      Alignment measurements were obtained in primary gaze with prism free glasses and showed a residual esotropia of 14-18 prism diopters. The left medial rectus was then recessed an additional approximate 3 mm until the hangback suture was on slack and repeat measurements showed a residual esotropia of approximately 5 prism diopters with fusion at near.      All sutures were then tied off and ends were cut short.  The adjustment process took an estimated 20 minutes to complete.

## 2024-12-02 NOTE — TELEPHONE ENCOUNTER
Talked to Bernadine today regarding her pain.  She tells me that the pain in both eyes more on the left side.  She does not have any vision changes nausea or vomiting.  She does mention that she is using more ointment in the left eye which is helping her pain.   I told her that this pain is expected more in the first day of the surgery and this should get better.  If she feels worsening pain or worsening vision in either eye she will call asap.  She verbalized understanding.  She is also using Tylenol and ibuprofen alternatively for the pain.    Anthony Zhou MD   PGY-5 Neuro-ophthalmology Fellow  Department of Ophthalmology   [facility]

## 2024-12-11 ENCOUNTER — OFFICE VISIT (OUTPATIENT)
Dept: OPHTHALMOLOGY | Facility: CLINIC | Age: 59
End: 2024-12-11
Attending: OPHTHALMOLOGY
Payer: COMMERCIAL

## 2024-12-11 DIAGNOSIS — H53.2 DOUBLE VISION: Primary | ICD-10-CM

## 2024-12-11 PROCEDURE — 99213 OFFICE O/P EST LOW 20 MIN: CPT | Performed by: OPHTHALMOLOGY

## 2024-12-11 PROCEDURE — 99024 POSTOP FOLLOW-UP VISIT: CPT | Performed by: OPHTHALMOLOGY

## 2024-12-11 ASSESSMENT — REFRACTION_WEARINGRX
OS_SPHERE: +0.75
SPECS_TYPE: PAL
OD_AXIS: 130
OS_AXIS: 005
OD_CYLINDER: +1.25
OS_CYLINDER: +1.00
OD_ADD: +2.00
OS_ADD: +2.00
OD_SPHERE: -0.25

## 2024-12-11 ASSESSMENT — VISUAL ACUITY
OD_CC: 20/125
OS_CC: 20/125
METHOD: SNELLEN - LINEAR
CORRECTION_TYPE: GLASSES

## 2024-12-11 ASSESSMENT — TONOMETRY
IOP_METHOD: ICARE
OD_IOP_MMHG: 09
OS_IOP_MMHG: 10

## 2024-12-11 ASSESSMENT — EXTERNAL EXAM - LEFT EYE: OS_EXAM: NORMAL,

## 2024-12-11 ASSESSMENT — EXTERNAL EXAM - RIGHT EYE: OD_EXAM: NORMAL

## 2024-12-11 NOTE — LETTER
2024    RE: Vaishali Fuller  : 1965  MRN: [id number]    To whom it may concern:    This patient with thyroid eye disease underwent extensive bilateral eye muscle surgery on 2024.  She has had prominent postoperative pain and blurry vision as well as severe sensitivity to light.  These are symptoms that are sometimes experienced following eye muscle surgery and she will require additional time to heal.  I do fully expect the symptoms to resolve.    At this current time the patient is too blurry and too photophobic for her to drive safely for the purposes of getting to work.  I would recommend that she remain at home away from work duties for the next week but should be able to return to work the following 2024.    -Surgery: 24  NAME OF OPERATION:  Recession of restricted, scarred right medial rectus by 7.0 mm  Recession of restricted, scarred right inferior rectus by 2.0 mm  Recession of restricted, scarred left medial rectus by 5.0 on adjustable suture. Recessed an additional approximate 3 mm during post-operative adjustment process.    Sincerely,    Murray Hendrickson MD  , Neuro-Ophthalmology and Adult Strabismus Surgery  The Javier SHORE and Jhoana Yi Chair in Neuro-Ophthalmology  Department of Ophthalmology and Visual Neurosciences  [facility]

## 2024-12-11 NOTE — PROGRESS NOTES
1. 1-2 week post-op status post strabismus surgery:     -Surgery: 12/2/24    PREOPERATIVE DIAGNOSIS (ES):  Thyroid eye disease with restrictive strabismus   Restrictive alternating esotropia   Restrictive right hypotropia     POSTOPERATIVE DIAGNOSIS (ES):  Same     NAME OF OPERATION:  Recession of restricted, scarred right medial rectus by 7.0 mm  Recession of restricted, scarred right inferior rectus by 2.0 mm  Recession of restricted, scarred left medial rectus by 5.0 on adjustable suture. Recessed an additional approximate 3 mm during post-operative adjustment process.    - Alignment: dramatically improved. Patient has residual 5 prism diopter esotropia. She seems to intermittently fuse today. May need prisms  - Diplopia:intermittent  - Healing up appropriately  - Maxitrol ophthalmic ointment: stop  - Start Predforte 1% four times a day in the operative eye(s) and decrease by one drop daily every week.  Course will complete in 1 month.    Follow-up with me in the first week of January. Will attempt to fit with Fresnel prism at that time.     Complete documentation of historical and exam elements from today's encounter can be found in the full encounter summary report (not reduplicated in this progress note).  I personally obtained the chief complaint(s) and history of present illness.  I confirmed and edited as necessary the review of systems, past medical/surgical history, family history, social history, and examination findings as documented by others; and I examined the patient myself.  I personally reviewed the relevant tests, images, and reports as documented above.  I formulated and edited as necessary the assessment and plan and discussed the findings and management plan with the patient and family   Murray Hendrickson MD

## 2025-01-03 ENCOUNTER — OFFICE VISIT (OUTPATIENT)
Dept: OPHTHALMOLOGY | Facility: CLINIC | Age: 60
End: 2025-01-03
Attending: OPHTHALMOLOGY
Payer: COMMERCIAL

## 2025-01-03 DIAGNOSIS — H50.05 ALTERNATING ESOTROPIA: Primary | ICD-10-CM

## 2025-01-03 PROCEDURE — 99024 POSTOP FOLLOW-UP VISIT: CPT | Mod: GC | Performed by: OPHTHALMOLOGY

## 2025-01-03 PROCEDURE — 99214 OFFICE O/P EST MOD 30 MIN: CPT | Performed by: OPHTHALMOLOGY

## 2025-01-03 ASSESSMENT — REFRACTION_WEARINGRX
OD_CYLINDER: SPHERE
OD_CYLINDER: +1.25
OD_SPHERE: -0.25
OD_AXIS: 135
OS_SPHERE: +3.25
OS_CYLINDER: SPHERE
OS_AXIS: 005
OS_CYLINDER: +1.00
OD_SPHERE: +3.25
OS_SPHERE: +0.75
SPECS_TYPE: READERS

## 2025-01-03 ASSESSMENT — CONF VISUAL FIELD
OS_INFERIOR_TEMPORAL_RESTRICTION: 0
OD_INFERIOR_NASAL_RESTRICTION: 0
OD_SUPERIOR_NASAL_RESTRICTION: 0
OS_SUPERIOR_TEMPORAL_RESTRICTION: 0
OD_NORMAL: 1
OS_INFERIOR_NASAL_RESTRICTION: 0
OS_NORMAL: 1
OD_INFERIOR_TEMPORAL_RESTRICTION: 0
OS_SUPERIOR_NASAL_RESTRICTION: 0
METHOD: COUNTING FINGERS
OD_SUPERIOR_TEMPORAL_RESTRICTION: 0

## 2025-01-03 ASSESSMENT — VISUAL ACUITY
OD_PH_CC+: +1
OS_CC: 20/20
OD_CC: 20/60
METHOD: SNELLEN - LINEAR
OS_CC+: -3
OD_PH_CC: 20/30
CORRECTION_TYPE: GLASSES

## 2025-01-03 ASSESSMENT — EXTERNAL EXAM - RIGHT EYE: OD_EXAM: NORMAL

## 2025-01-03 ASSESSMENT — TONOMETRY
OD_IOP_MMHG: 17
OS_IOP_MMHG: 14
IOP_METHOD: ICARE

## 2025-01-03 ASSESSMENT — EXTERNAL EXAM - LEFT EYE: OS_EXAM: NORMAL,

## 2025-01-03 NOTE — PROGRESS NOTES
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     Today her strabismus exam reveals a commitant esotropia of 8 pd without any prism in her glasses. She fuses at distance with 6-20 base out over the right eye. At near, she fuses with up to 30 pd base out. Healing well after strabismus surgery. She does not show signs of active myasthenia gravis currently. t    Follow-up Tuesday, March 4 in thyroid eye disease clinic. Consider additional strabismus surgery at that time versus ground in prism glasses and then assessment for eyelid surgery.     Initial visit HPI from thyroid eye disease clinic AND historical data:  The patient has a diagnosis of Graves (2021) and myasthenia gravis (2022), for which she was treated with mestinon (ongoing) and prednisone taper (completed July 2023). She experienced onset of diagonal binocular diplopia last year (sept 2022), for which she received ground in prisms, which alleviated her subjective complaints.  Since completing her prednisone taper, she has experienced worsening of her diagonal binocular diplopia and onset of RUL retraction. She is uncertain about fluctuation in her double vision with time or with fatigue. She denies eye pain, discharge, flashes, or floaters.    Of note, has undergone 2 eyelid surgeries (BULB and ptosis repair as well as RUL ptosis revision and brow lift).     Patient had a negative single fiber electromyography testing and negative serologies for myasthenia gravis per patient.  Clinical diagnosis of myasthenia gravis made based upon resolution of ptosis with prednisone course.    MRI brain scan (1/2023):  1. No evidence for acute infarction, abnormal intracranial  enhancement, intracranial mass, sinusitis or mastoiditis.   2. Mild probable chronic small vessel ischemic foci involving cerebral hemispheric white matter bilaterally. No corpus callosal or posterior fossa involvement.   3. Mild asymmetric enlargement and increased enhancement/edema of the right medial rectus muscle with sparing of the tendinous insertions. 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 No evidence for associated fluid collection, nodularity or optic nerve abnormality. Inflammatory, infectious and neoplastic etiologies could also be considered however no evidence for nodularity, associated fluid collection or adjacent stranding of the orbital fat. Left orbit unremarkable.     CT orbits WO Contrast  (4/16/2024):  1.  Mild asymmetric right extraocular muscle enlargement, consistent with history of thyroid eye disease and not significantly changed since 01/17/2023 allowing for differences in technique.  2.  Right maxillary sinus fluid and mucosal thickening, suggestive of acute sinusitis in the appropriate clinical setting. Occlusion of the right ostiomeatal unit.    Last thyroid levels checked 8/13/24: TSH 0.48, Free T3 2.9, Free T4 0.9.    04/2024 The patient returned with dramatically worsened alternating esotropia compared to our prior exam 09/2023 and had the appearance SUGGESTIVE of inactive thyroid eye disease with a FAIZA score of 2/10.   Today her sensorimotor examination remains stable demonstrating an approximate 35-40 prism diopter alternating esotropia stable from last visit in May.     4/16/24 CT scan was performed and did not show dramatic changes in the extraocular muscle appearance as compared to her MRI.    8/21/2024 Still experiencing constant double vision that is horizontal/oblique. Taking 120mg TID of mestinon, selenium 100 micrograms 2x/day. Taking levothyroxine consistently. Taking 50 mg prednisone. Sensorimotor exam / strabismus is unchanged indicating it is very  likely that her strabismus is entirely thyroid eye disease related.     12/2/24: patient underwent strabismus surgery   PREOPERATIVE DIAGNOSIS (ES):  Thyroid eye disease with restrictive strabismus   Restrictive alternating esotropia   Restrictive right hypotropia     NAME OF OPERATION:  Recession of restricted, scarred right medial rectus by 7.0 mm  Recession of restricted, scarred right inferior rectus by 2.0 mm  Recession of restricted, scarred left medial rectus by 5.0 on adjustable suture. Recessed an additional approximate 3 mm during post-operative adjustment process.    12/11/24: Experiencing pain postoperatively. Alignment dramatically improved. Patient has residual 5 prism diopter esotropia. She seems to intermittently fuse today. May need prisms.    Interval history since initial visit:     01/03/25: Patient returns for POM1 s/p strabismus surgery.   Her eyes feel much better. Still using steroid drop 1x per day. Uses blink AT multiple times a day and ointment before bed. She has been wearing non-prism progressive lenses at home, and a pair of prism glasses (?8 pd) for driving, which have been working well. Only intermittent diplopia at this point.     Exam:  Her sensorimotor exam today shows a concomitant 8 prism diopter esotropia. Fuses well with 8 base out prism in all gaze positions.        Precharting:   Ryan Ferguson MD  Resident Physician, PGY-3    Complete documentation of historical and exam elements from today's encounter can be found in the full encounter summary report (not reduplicated in this progress note).  I personally obtained the chief complaint(s) and history of present illness.  I confirmed and edited as necessary the review of systems, past medical/surgical history, family history, social history, and examination findings as documented by others; and I examined the patient myself.  I personally reviewed the relevant tests, images, and reports as documented above.  I formulated and  edited as necessary the assessment and plan and discussed the findings and management plan with the patient and family     Murray Hendrickson MD

## 2025-02-03 DIAGNOSIS — G70.00 MYASTHENIA (H): Primary | ICD-10-CM

## 2025-02-04 RX ORDER — PYRIDOSTIGMINE BROMIDE 60 MG/1
120 TABLET ORAL 3 TIMES DAILY
Qty: 180 TABLET | Refills: 0 | Status: SHIPPED | OUTPATIENT
Start: 2025-02-04

## 2025-02-04 NOTE — TELEPHONE ENCOUNTER
Last Written Prescription:  pyRIDostigmine (MESTINON) 60 MG tablet -- -- 8/25/2023 -- --   Sig - Route: Take 120 mg by mouth 3 times daily - Oral   Class: Historical     ----------------------  Last Visit Date: 1/3/25  Future Visit Date: 3/4/25  ----------------------    Refill decision: Medication unable to be refilled by RN due to:         [x]    Medication - Last script is Reported/Historical/Transitional    Penelope Crouch RN  [facility] Central Nursing/Red Flag Triage & Med Refill Team     Request from pharmacy:  Requested Prescriptions   Pending Prescriptions Disp Refills    pyRIDostigmine (MESTINON) 60 MG tablet [Pharmacy Med Name: pyRIDostigmine Bromide Oral Tablet 60 MG] 180 tablet 0     Sig: TAKE TWO TABLETS BY MOUTH THREE TIMES DAILY       There is no refill protocol information for this order

## 2025-03-04 ENCOUNTER — OFFICE VISIT (OUTPATIENT)
Dept: OPHTHALMOLOGY | Facility: CLINIC | Age: 60
End: 2025-03-04
Attending: OPHTHALMOLOGY
Payer: COMMERCIAL

## 2025-03-04 DIAGNOSIS — E07.9 THYROID EYE DISEASE: ICD-10-CM

## 2025-03-04 DIAGNOSIS — H57.89 THYROID EYE DISEASE: ICD-10-CM

## 2025-03-04 DIAGNOSIS — H50.22 HYPERTROPIA OF LEFT EYE: ICD-10-CM

## 2025-03-04 DIAGNOSIS — H50.05 ALTERNATING ESOTROPIA: ICD-10-CM

## 2025-03-04 DIAGNOSIS — G70.00 MYASTHENIA (H): ICD-10-CM

## 2025-03-04 DIAGNOSIS — H02.536 LID RETRACTION OF LEFT EYE: Primary | ICD-10-CM

## 2025-03-04 DIAGNOSIS — H53.2 DIPLOPIA: ICD-10-CM

## 2025-03-04 PROCEDURE — 92285 EXTERNAL OCULAR PHOTOGRAPHY: CPT | Performed by: OPHTHALMOLOGY

## 2025-03-04 PROCEDURE — 99213 OFFICE O/P EST LOW 20 MIN: CPT | Performed by: OPHTHALMOLOGY

## 2025-03-04 PROCEDURE — 92060 SENSORIMOTOR EXAMINATION: CPT | Performed by: OPHTHALMOLOGY

## 2025-03-04 ASSESSMENT — CONF VISUAL FIELD
OS_SUPERIOR_TEMPORAL_RESTRICTION: 0
OD_INFERIOR_TEMPORAL_RESTRICTION: 0
OS_INFERIOR_NASAL_RESTRICTION: 0
OS_INFERIOR_TEMPORAL_RESTRICTION: 0
OS_NORMAL: 1
OS_SUPERIOR_NASAL_RESTRICTION: 0
OD_NORMAL: 1
OD_SUPERIOR_NASAL_RESTRICTION: 0
OD_SUPERIOR_TEMPORAL_RESTRICTION: 0
OD_INFERIOR_NASAL_RESTRICTION: 0

## 2025-03-04 ASSESSMENT — SLIT LAMP EXAM - LIDS: COMMENTS: NO LAG

## 2025-03-04 ASSESSMENT — VISUAL ACUITY
OS_PH_CC: 20/25
OD_CC: 20/40
OS_CC: 20/40
OS_PH_CC+: -3
OS_CC+: +2
METHOD: SNELLEN - LINEAR
OD_CC+: +2

## 2025-03-04 ASSESSMENT — EXTERNAL EXAM - RIGHT EYE: OD_EXAM: NORMAL

## 2025-03-04 ASSESSMENT — REFRACTION_WEARINGRX
OS_VPRISM: 1 BD
OD_CYLINDER: +1.25
OD_SPHERE: -0.50
OS_AXIS: 007
OD_HPRISM: 7 BO
OD_AXIS: 135
OD_VPRISM: 3 BU
OS_CYLINDER: +1.25
OS_SPHERE: +0.50
OS_HPRISM: 7 BO

## 2025-03-04 ASSESSMENT — MARGIN REFLEX DISTANCE
OD_MRD1: 5
OS_MRD1: 7

## 2025-03-04 ASSESSMENT — TONOMETRY
OD_IOP_MMHG: 12
IOP_METHOD: ICARE
OS_IOP_MMHG: 12

## 2025-03-04 ASSESSMENT — LAGOPHTHALMOS
OS_LAGOPHTHALMOS: 5
OD_LAGOPHTHALMOS: 0

## 2025-03-04 ASSESSMENT — EXTERNAL EXAM - LEFT EYE: OS_EXAM: NORMAL

## 2025-03-04 NOTE — NURSING NOTE
Chief Complaint(s) and History of Present Illness(es)       Thyroid Disease              Laterality: both eyes    Associated symptoms: photophobia.  Negative for redness and tearing    Comments: History of possible OMG and Taking 120mg TID of mestinon, selenium 100 micrograms 2x/day, Methimazole 5 mg daily. Patient reports dry eyes, tearing, and light sensitivity. She has been using Ats multiple times per hour, blink and systane and an ointment at bedtime. No strabismus noticed since surgery. No double vision with glasses. Has a fresnel on sunglasses, and she states that the lines were crossed while driving yesterday.              Comments    Inf; Patient   S/p strabismus surgery  1. Recession of restricted, scarred right medial rectus by 7.0 mm  2. Recession of restricted, scarred right inferior rectus by 2.0 mm  3. Recession of restricted, scarred left medial rectus by 5.0 on adjustable suture. Recessed an additional approximate 3 mm during post-operative adjustment process.

## 2025-03-04 NOTE — PROGRESS NOTES
Initial visit HPI from thyroid eye disease clinic:  The patient has a diagnosis of Graves (2021) and myasthenia gravis (2022), for which she was treated with mestinon (ongoing) and prednisone taper (completed July 2023). She experienced onset of diagonal binocular diplopia last year (sept 2022), for which she received ground in prisms, which alleviated her subjective complaints.  Since completing her prednisone taper, she has experienced worsening of her diagonal binocular diplopia and onset of RUL retraction. She is uncertain about fluctuation in her double vision with time or with fatigue. She denies eye pain, discharge, flashes, or floaters.    Of note, has undergone 2 eyelid surgeries (BULB and ptosis repair as well as RUL ptosis revision and brow lift).     Patient had a negative single fiber electromyography testing and negative serologies for myasthenia gravis per patient.  Clinical diagnosis of myasthenia gravis made based upon resolution of ptosis with prednisone course.    MRI brain scan (1/2023):  1. No evidence for acute infarction, abnormal intracranial enhancement, intracranial mass, sinusitis or mastoiditis.   2. Mild probable chronic small vessel ischemic foci involving cerebral hemispheric white matter bilaterally. No corpus callosal or posterior fossa involvement.   3. Mild asymmetric enlargement and increased enhancement/edema of the right medial rectus muscle with sparing of the tendinous insertions. 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 No evidence for associated fluid collection, nodularity or optic nerve abnormality. Inflammatory, infectious and neoplastic etiologies could also be considered however no evidence for nodularity, associated fluid collection or adjacent stranding of the orbital fat. Left orbit unremarkable.     CT orbits WO Contrast  (4/16/2024):  1.  Mild asymmetric right extraocular muscle enlargement, consistent with history of thyroid eye disease and  not significantly changed since 01/17/2023 allowing for differences in technique.  2.  Right maxillary sinus fluid and mucosal thickening, suggestive of acute sinusitis in the appropriate clinical setting. Occlusion of the right ostiomeatal unit.    Interval hx since last visit with me 5/29/24:   Patient underwent bilateral medial rectus recession and right inferior rectus recession on 12/2/24 (with ). Currently on 120 mg TID of mestinon. selenium 100 micrograms 2x/day. Taking methimazole 5 mg daily. Subjectively, denies any AALIYAH symptoms.    Thyroid history:  Diagnosed when? Graves, fall 2021  PARISH: None  Thyroidectomy: None    TSI (date): n/a      Previous results: n/a    Eye symptoms (since when):   Proptosis (better/worse/same since last visit): no  Diplopia(better/worse/same since last visit): same since last visit  Eyelid retraction(better/worse/same since last visit): same since last visit  Tearing(better/worse/same since last visit): better  Redness (better/worse/same since last visit): same  Pain (better/worse/same since last visit): no  Pain to move the eyes (better/worse/same since last visit): no  Blurred vision: Yes    Ocular history:   Orbital decompression (date, details): no  Strabismus surgery (date, details): no  Eyelid surgery (date, details): no  -S/p RUL ptosis repair and brow lift (May 2022)  -S/p BULB and ptosis repair (2020)    Exam:   Paloma (base):  18/17- stable  Strabismus (better/worse/same): Residual esotropia (small)- much better after strabismus surgery  (better/worse/same): left upper eyelid retraction- stable    FAIZA Score  1. Spontaneous orbital pain.     0  2. Gaze evoked orbital pain.     0  3. Eyelid swelling due to active thyroid eye disease  0  4. Eyelid erythema.      0  5. Conjunctival redness due to active thyroid eye disease . 0  6. Chemosis.        0  7. Inflammation of caruncle OR plica.   0    8. Increase of > 2mm in proptosis.    0   9. Decrease in uniocular  excursion in any direction of > 8 . 0  10. Decrease of acuity equivalent to 1 Snellen line.  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 Patient much better after strabismus surgery but has a moderate angle residual esotropia. She is motivated for additional strabismus surgery.  Plan to proceed with left lateral rectus resection on fixed suture     History of Graves disease with comorbid antibody negative and single fiber electromyography negative probable myasthenia gravis (s/p prednisone taper), currently managed with mestinon.     On exam today,  Patient is ortho in primary gaze wearing ground in prism glasses but really wants to get out of prisms, FAIAZ score of 0/10. Clinically inactive AALIYAH.    Sensorimotor exam shows 12 prism diopter esotropia in primary gaze, 18 prism diopters in right gaze and 8 prism diopters in left gaze.   She has excellent convergence amplitudes and fuses 6-30 base out in primary gaze without vertical strabismus correction.     We discussed in detail the risks, benefits, and alternatives of eye muscle correction surgery including the very rare risk of death or serious morbidity from a general anesthesia complication and the rare risk of severe vision loss in the operative eye(s) secondary to retinal detachment or endophthalmitis.  We discussed more likely sub-optimal outcomes including the unanticipated need for additional strabismus surgery.  Finally the patient was aware that prisms glasses may be required to optimize single vision following surgery.    After a thorough discussion of these risks, the patient decided to proceed with strabismus surgery.  The surgical plan is as  follows:     1. Left lateral rectus resection on fixed suture     Follow-up 1 week after strabismus surgery.    Plan to operate at: [facility]  Prism free glasses for adjustment: Nonadjustable         Raul Cuenca MD  Ophthalmology PGY-2   [facility]    35 minutes were spent on the date of the encounter by me doing chart review, history and exam, documentation, and further activities as noted above    Complete documentation of historical and exam elements from today's encounter can be found in the full encounter summary report (not reduplicated in this progress note).  I personally obtained the chief complaint(s) and history of present illness.  I confirmed and edited as necessary the review of systems, past medical/surgical history, family history, social history, and examination findings as documented by others; and I examined the patient myself.  I personally reviewed the relevant tests, images, and reports as documented above.  I formulated and edited as necessary the assessment and plan and discussed the findings and management plan with the patient and family.  I personally reviewed the ophthalmic test(s) associated with this encounter, agree with the interpretation(s) as documented by the resident/fellow, and have edited the corresponding report(s) as necessary.     Murray Hendrickson MD

## 2025-03-05 RX ORDER — PYRIDOSTIGMINE BROMIDE 60 MG/1
120 TABLET ORAL 3 TIMES DAILY
Qty: 180 TABLET | Refills: 6 | Status: SHIPPED | OUTPATIENT
Start: 2025-03-05

## 2025-03-05 NOTE — TELEPHONE ENCOUNTER
Last Written Prescription:  pyRIDostigmine (MESTINON) 60 MG tablet 180 tablet 0 2/4/2025     ----------------------  Last Visit Date: 3/4/25  Future Visit Date: NONE  ----------------------        Refill decision: Medication unable to be refilled by RN due to: Medication not included in refill protocol policy     Penelope Crouch RN  [facility] Central Nursing/Red Flag Triage & Med Refill Team       Request from pharmacy:  Requested Prescriptions   Pending Prescriptions Disp Refills    pyRIDostigmine (MESTINON) 60 MG tablet [Pharmacy Med Name: pyRIDostigmine Bromide Oral Tablet 60 MG] 180 tablet 0     Sig: TAKE TWO TABLETS BY MOUTH THREE TIMES DAILY       There is no refill protocol information for this order

## 2025-03-06 ENCOUNTER — TELEPHONE (OUTPATIENT)
Dept: OPHTHALMOLOGY | Facility: CLINIC | Age: 60
End: 2025-03-06
Payer: COMMERCIAL

## 2025-03-10 PROBLEM — H53.2 DIPLOPIA: Status: ACTIVE | Noted: 2025-03-04

## 2025-04-08 NOTE — H&P (VIEW-ONLY)
"Pre-Operative Assessment        4/8/2025   Pre-Op Assessment Procedure Details   Procedure Eye muscle correction, left eye   Surgeon Murray Hendrickson MD   Location [facility]   Procedure Date 4/21/2025         Subjective   Bernadine is a 59 y.o. old female here for pre-operative evaluation for procedure noted above.     In addition to above scheduled surgery, patient wonders if she can switch her vaginal estrogen form from the cream to vaginal tablets due to ease of use and the messy nature of the cream.     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      \"multiple small pulmonary nodules with solid appearance measuring 6 mm or less including a 4 mm left lower lobe pulmonary nodule....pleural-based nodule right middle lobe measuring 6 mm. No suspicious pulmonary nodules.\"      \"PN Consensus Recommendation for multiple solid nodules less than 6 mm:     Low risk patients: No routine follow-up.    Stable on f/u CT 4/19/24, considered benign, no further f/u.      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         Osteopenia 10/05/2021     Overview Note:     dexa 10/5/21 T -1.6 fem neck. Repeat dexa 5 yrs.        Family history of factor V Leiden mutation 08/30/2021     Overview Note:     father       Trochanteric bursitis of left hip 07/01/2021     Chronic insomnia 07/01/2021     Brow ptosis " 01/26/2021     Overview Note:     Added automatically from request for surgery [id number]       Brow ptosis, bilateral 10/12/2020     Overview Note:     Added automatically from request for surgery [id number]       Visual field defect 10/12/2020     Overview Note:     Added automatically from request for surgery [id number]       Prediabetes 03/13/2020     Overview Note:     a1c 5.7 3/20       Droopy eyelid, bilateral 01/13/2020     Overview Note:     Added automatically from request for surgery [id number]       Ptosis of right eyelid 05/29/2019     Postmenopausal atrophic vaginitis 05/29/2019     Family history of mitral valve prolapse 05/29/2019     Overview Note:     Nl echo 7/19       Overweight (BMI 25.0-29.9) 05/21/2018     Essential hypertension (HRC)      Overview Note:     lisinopril, able to get off with diet/excercise/wt loss          Past Medical History:   Diagnosis Date     Gastroesophageal reflux disease 2021    Off and on     HTN (hypertension) (HRC)     lisinopril, able to get off with diet/excercise/wt loss     Left elbow fracture 1/27/17    occult radial head fx, tx'd conservatively.      Migraine     resolved with menopause     Obesity (BMI 30.0-34.9)      Overweight (BMI 25.0-29.9) (HRC) 5/21/2018     Prediabetes 3/13/2020    a1c 5.7 3/20     Trochanteric bursitis of left hip 7/1/2021        Past Surgical History:   Procedure Laterality Date     BLPHPLSTY UP EYELD; W/EXCESS SKIN Bilateral 06/11/2020     Bilat bleph, ptosis      BLPHPLSTY UP EYELD; W/EXCESS SKIN Bilateral 05/05/2022    Bilat bleph, ptosis, endo brow     COLONOSCOPY       CORNEAL SURGERY Bilateral ?15 years ago    LASIK     EYE SURGERY  Brow lift and ptosis repair 5/5/22     FOOT SURGERY Bilateral 1985    aye excision of haglunds deformity of heel     KNEE ARTHROSCOPY Right     partial meniscectomy     KNEE SURGERY       TUBAL LIGATION          Current Outpatient Medications   Medication Instructions     acetaminophen (TYLENOL) 1,000 mg, Oral,  QID, Maximum acetaminophen dose is 4000 mg in 24 hours     acetylcysteine (NAC) 600 MG capsule 1 Capsule, DAILY     Bromelains 500 MG 1 Each, DAILY     calcium carbonate 600 mg, BID     cetirizine (ZYRTEC) 10 mg, DAILY     Cholecalciferol (VITAMIN D) 50 MCG ( UT) CAPS 1 Capsule, DAILY     Digestive Enzymes (BETAINE HCL OR) 648 mg, DAILY     estradiol 10 mcg, Vaginal, TWICE WEEKLY (NS)     Glucosamine-Chondroit-Vit C-Mn (GLUCOSAMINE CHONDR 1500 COMPLX) 2 Capsules, DAILY     lisinopril (ZESTRIL) 2.5 mg, Oral, DAILY     methIMAzole (TAPAZOLE) 5 mg, Oral, DAILY     MISC NATURAL PRODUCTS OR Happy Tummy 15 drops = 200mg of Humic Acid for digestion support     NATTOKINASE  mg, DAILY     Ozempic (1 MG/DOSE) 1 mg, Subcutaneous, WEEKLY     pyRIDostigmine (MESTINON) 120 mg, Oral, TID     Selenium 200 MCG 1 Capsule, DAILY     traZODone (DESYREL)  mg, Oral, HS     Turmeric (QC TUMERIC COMPLEX OR) 750 mg, BID     Vitamin C-Lizzie Hips 1,000 mg, DAILY     zinc gluconate 50 mg, DAILY       No Known Allergies    Social History     Occupational History     Not on file   Tobacco Use     Smoking status: Former     Current packs/day: 0.00     Average packs/day: 0.2 packs/day for 1 year (0.2 ttl pk-yrs)     Types: Cigarettes     Start date: 1987     Quit date: 1988     Years since quittin.9     Smokeless tobacco: Never     Tobacco comments:     Smoked about 1 pk in a year   Vaping Use     Vaping status: Never Used   Substance and Sexual Activity     Alcohol use: Yes     Alcohol/week: 5.0 standard drinks of alcohol     Types: 3 Glasses of wine, 2 Standard drinks or equivalent per week     Comment: 5 glasses of wine/wk.      Drug use: No     Sexual activity: Yes     Partners: Male     Birth control/protection: Surgical     Comment: Tubial ligation         Patient's last menstrual period was 2017.    Family History   Problem Relation Name Age of Onset     Arrhythmia Birth Mother Sherly         mitral valve  prolapse      Osteoporosis Birth Mother Sherly      Depression Birth Mother Sherly      Coronary Artery Disease Birth Father Gerry         cabg 4v age 69     Hypertension Birth Father Gerry      Clotting Disorder Birth Father Gerry 69        factor v leiden     Other (Pre diabetic) Birth Father Gerry      Heart Disease Birth Father Gerry      High Cholesterol Birth Father Gerry      Cancer, Breast Sister Julia 60     Hypertension Sister Julia      Cancer Sister Julia         Breast Ca     Mitral Valve Prolapse Sister       Celiac Disease Daughter       Anxiety Daughter       Heart Attack Maternal Grandfather       Rheum Arthritis Paternal Grandmother       Anxiety Son       ADHD Son       Anxiety Son       Other (Dyslexia) Son       Cancer, Ovary Negative Family History         Review of Systems:        4/8/2025   ET Amb PreOp Assessment Sx   Have you had a heart attack in the last 30 days? No   Have you experienced chest tightening or chest pressure with activity? No   Do you wake at night with difficulty breathing? No   Do you have swelling in your feet or ankles? No   Do you get short of breath if lying flat at night? No   Do you hear wheezing or whistling when you breathe? No   Have you had a cough, runny nose, or cold symptoms in the last 2 weeks? No   Have you tested positive for Covid in the last 6 months? No   Do you have a long-standing cough? No   Do you snore or are you sleepy during the day? No   Do you have any symptoms due to a recent concussion? No   Do you or close relatives have bleeding or clotting problems? No   Have you taken Aspirin, Ibuprofen (Advil) or Naproxen (Aleve) in the last 7 days? No   Do you or close relatives have a history of a severe or life-threatening reaction to anesthesia? No       Snores occasionally at night. No apneic episodes.   Dad has factor 5 leiden. Has not been tested. No issues with her other surgeries, has not needed Lovenox injections for prophylaxis in  "the past.    Estimated Functional Capacity  Can you climb one flight of stairs, or walk up a gradual uphill without stopping? yes, functional capacity is more than or equal to 4 METS    Objective   /75 (BP Location: Left Arm, BP Cuff Size: Regular)   Pulse 76   Ht 5' 1\" (1.549 m)   Wt 141 lb (64 kg)   LMP 01/02/2017   BMI 26.64 kg/m      Physical Exam:  General Appearance: alert, well appearing, and in no apparent distress  Eyes:  lids normal, sclera clear, and conjunctiva normal  ENT:  oropharynx clear and TMs normal  Neck:  no lymphadenopathy and no thyromegaly or nodules  Heart:  regular rate and rhythm and no murmurs, gallops or rubs  Lungs:  clear to auscultation and no wheezes, rales or rhonchi  Abdomen:  soft, nondistended, nontender, no palpable masses, and no organomegaly  Extremities:  no edema  Skin:  no rashes or worrisome lesions  Neurologic:  normal speech, no facial droop, and alert and oriented x 3    Data:      Labs: Yes:   Sodium   Date Value Ref Range Status   04/08/2025 140 136 - 145 mmol/L Final     Potassium   Date Value Ref Range Status   04/08/2025 4.4 3.5 - 5.1 mmol/L Final     Creatinine   Date Value Ref Range Status   04/08/2025 1.02 0.55 - 1.02 mg/dL Final     Hemoglobin   Date Value Ref Range Status   05/15/2020 14.0 12.0 - 15.5 g/dL Final     Hemoglobin A1C   Date Value Ref Range Status   11/25/2024 5.7 (H) <=5.6 % Final     Hemoglobin A1C (Rapid)   Date Value Ref Range Status   07/14/2021 5.4 <=5.6 % Final     Glucose   Date Value Ref Range Status   12/06/2023 90 70 - 100 mg/dL Final     Comment:     The given reference range is for the fasting state. Non-fasting reference range for glucose is 70 - 180 mg/dL.     ECG: Yes: date and results:   Sinus rhythm  Normal ECG  When compared with ECG of 11-APR-2022 17:01,  No significant change was found  Confirmed by Ruby Bryant ([id number]) on 11/25/2024 3:28:56 PM       Assessment/Plan   Patient is medically optimized for planned " procedure(s).      ICD-10-CM    1. Preop examination  Z01.818 Potassium     Creatinine / GFR     Sodium      2. Ocular myasthenia gravis (HRC)  G70.00       3. Visual field defect  H53.40       4. Essential hypertension (HRC)  I10 Potassium     Creatinine / GFR     Sodium      5. Postmenopausal atrophic vaginitis  N95.2 estradiol 10 MCG TABS vaginal tablet        Essential hypertension: Well-controlled with 2.5 mg daily of lisinopril. Advised to hold on day of procedure.  Genitourinary syndrome of menopause: Stable, vaginal estrogen prescribed instead of Estrace cream per patient request. She will hold vaginal estrogen for 2 weeks prior to her procedure but low risk for clots as this is vaginal only.    Special risks:  At risk for or history of sleep apnea. Recommend RT assessment.  Family history of factor 5 Leiden, patient not tested. Postoperative prophylaxis per surgeon discretion.     Medication recommendations:    Patient Instructions   lisinopril (ZESTRIL) 5 MG tablet [[id number]]  -  Do not take on the morning of procedure.     semaglutide (OZEMPIC) 4 MG/3ML injection [[id number]]  -  For medications you take weekly: Do not take within 7 days of your procedure. Limit diet to liquids for 24 hours prior to the procedure.    Hold estrogen for 2 weeks prior to procedure.     Follow your individualized medication recommendations as described above.    In addition, please stop all over-the-counter medications including aspirin, ibuprofen (Advil, Motrin), naproxen (Aleve, Naprosyn), herbal remedies and supplements one week prior to procedure unless directed otherwise by your care team. You may continue to take acetaminophen (Tylenol) up to the day of your procedure.    Continue all other medications as currently taking. Let your care team know if you have questions.    On the day of your procedure, do not wear any hair product including hair sprays and gels and avoid using body sprays and  deodorants/antiperspirants.    Bring with you to the site of the procedure:    Any oral appliances or CPAP equipment related to sleep apnea  Any other health-related equipment or devices you use daily      Electronically signed by: BARBARA Alvarez, CNP  4/8/2025, 3:23 PM

## 2025-04-18 ENCOUNTER — ANESTHESIA EVENT (OUTPATIENT)
Dept: SURGERY | Facility: AMBULATORY SURGERY CENTER | Age: 60
End: 2025-04-18
Payer: COMMERCIAL

## 2025-04-18 RX ORDER — ONDANSETRON 2 MG/ML
4 INJECTION INTRAMUSCULAR; INTRAVENOUS EVERY 30 MIN PRN
Status: CANCELLED | OUTPATIENT
Start: 2025-04-18

## 2025-04-18 RX ORDER — ONDANSETRON 4 MG/1
4 TABLET, ORALLY DISINTEGRATING ORAL EVERY 30 MIN PRN
Status: CANCELLED | OUTPATIENT
Start: 2025-04-18

## 2025-04-18 RX ORDER — NALOXONE HYDROCHLORIDE 0.4 MG/ML
0.1 INJECTION, SOLUTION INTRAMUSCULAR; INTRAVENOUS; SUBCUTANEOUS
Status: CANCELLED | OUTPATIENT
Start: 2025-04-18

## 2025-04-18 RX ORDER — DEXAMETHASONE SODIUM PHOSPHATE 10 MG/ML
4 INJECTION, SOLUTION INTRAMUSCULAR; INTRAVENOUS
Status: CANCELLED | OUTPATIENT
Start: 2025-04-18

## 2025-04-21 ENCOUNTER — ANESTHESIA (OUTPATIENT)
Dept: SURGERY | Facility: AMBULATORY SURGERY CENTER | Age: 60
End: 2025-04-21
Payer: COMMERCIAL

## 2025-04-21 ENCOUNTER — HOSPITAL ENCOUNTER (OUTPATIENT)
Facility: AMBULATORY SURGERY CENTER | Age: 60
Discharge: HOME | End: 2025-04-21
Attending: OPHTHALMOLOGY
Payer: COMMERCIAL

## 2025-04-21 VITALS
RESPIRATION RATE: 18 BRPM | HEART RATE: 59 BPM | HEIGHT: 61 IN | WEIGHT: 145 LBS | TEMPERATURE: 97.4 F | DIASTOLIC BLOOD PRESSURE: 77 MMHG | OXYGEN SATURATION: 100 % | BODY MASS INDEX: 27.38 KG/M2 | SYSTOLIC BLOOD PRESSURE: 138 MMHG

## 2025-04-21 DIAGNOSIS — Z98.890 STATUS POST EYE SURGERY: Primary | ICD-10-CM

## 2025-04-21 PROCEDURE — 67311 REVISE EYE MUSCLE: CPT | Mod: LT

## 2025-04-21 PROCEDURE — 67311 REVISE EYE MUSCLE: CPT | Mod: LT | Performed by: OPHTHALMOLOGY

## 2025-04-21 RX ORDER — HYDROMORPHONE HYDROCHLORIDE 1 MG/ML
0.4 INJECTION, SOLUTION INTRAMUSCULAR; INTRAVENOUS; SUBCUTANEOUS EVERY 5 MIN PRN
Status: DISCONTINUED | OUTPATIENT
Start: 2025-04-21 | End: 2025-04-22 | Stop reason: HOSPADM

## 2025-04-21 RX ORDER — KETOROLAC TROMETHAMINE 30 MG/ML
INJECTION, SOLUTION INTRAMUSCULAR; INTRAVENOUS PRN
Status: DISCONTINUED | OUTPATIENT
Start: 2025-04-21 | End: 2025-04-21

## 2025-04-21 RX ORDER — FENTANYL CITRATE 50 UG/ML
25 INJECTION, SOLUTION INTRAMUSCULAR; INTRAVENOUS EVERY 5 MIN PRN
Status: DISCONTINUED | OUTPATIENT
Start: 2025-04-21 | End: 2025-04-22 | Stop reason: HOSPADM

## 2025-04-21 RX ORDER — GLYCOPYRROLATE 0.2 MG/ML
INJECTION, SOLUTION INTRAMUSCULAR; INTRAVENOUS PRN
Status: DISCONTINUED | OUTPATIENT
Start: 2025-04-21 | End: 2025-04-21

## 2025-04-21 RX ORDER — LIDOCAINE HYDROCHLORIDE 20 MG/ML
INJECTION, SOLUTION INFILTRATION; PERINEURAL PRN
Status: DISCONTINUED | OUTPATIENT
Start: 2025-04-21 | End: 2025-04-21

## 2025-04-21 RX ORDER — HYDROMORPHONE HYDROCHLORIDE 1 MG/ML
0.2 INJECTION, SOLUTION INTRAMUSCULAR; INTRAVENOUS; SUBCUTANEOUS EVERY 5 MIN PRN
Status: DISCONTINUED | OUTPATIENT
Start: 2025-04-21 | End: 2025-04-22 | Stop reason: HOSPADM

## 2025-04-21 RX ORDER — SODIUM CHLORIDE, SODIUM LACTATE, POTASSIUM CHLORIDE, CALCIUM CHLORIDE 600; 310; 30; 20 MG/100ML; MG/100ML; MG/100ML; MG/100ML
INJECTION, SOLUTION INTRAVENOUS CONTINUOUS
Status: DISCONTINUED | OUTPATIENT
Start: 2025-04-21 | End: 2025-04-21 | Stop reason: HOSPADM

## 2025-04-21 RX ORDER — PROPOFOL 10 MG/ML
INJECTION, EMULSION INTRAVENOUS CONTINUOUS PRN
Status: DISCONTINUED | OUTPATIENT
Start: 2025-04-21 | End: 2025-04-21

## 2025-04-21 RX ORDER — PROPOFOL 10 MG/ML
INJECTION, EMULSION INTRAVENOUS PRN
Status: DISCONTINUED | OUTPATIENT
Start: 2025-04-21 | End: 2025-04-21

## 2025-04-21 RX ORDER — ONDANSETRON 2 MG/ML
INJECTION INTRAMUSCULAR; INTRAVENOUS PRN
Status: DISCONTINUED | OUTPATIENT
Start: 2025-04-21 | End: 2025-04-21

## 2025-04-21 RX ORDER — DEXAMETHASONE SODIUM PHOSPHATE 4 MG/ML
INJECTION, SOLUTION INTRA-ARTICULAR; INTRALESIONAL; INTRAMUSCULAR; INTRAVENOUS; SOFT TISSUE PRN
Status: DISCONTINUED | OUTPATIENT
Start: 2025-04-21 | End: 2025-04-21

## 2025-04-21 RX ORDER — LIDOCAINE 40 MG/G
CREAM TOPICAL
Status: DISCONTINUED | OUTPATIENT
Start: 2025-04-21 | End: 2025-04-21 | Stop reason: HOSPADM

## 2025-04-21 RX ORDER — NALOXONE HYDROCHLORIDE 0.4 MG/ML
0.1 INJECTION, SOLUTION INTRAMUSCULAR; INTRAVENOUS; SUBCUTANEOUS
Status: DISCONTINUED | OUTPATIENT
Start: 2025-04-21 | End: 2025-04-22 | Stop reason: HOSPADM

## 2025-04-21 RX ORDER — OXYCODONE HYDROCHLORIDE 5 MG/1
10 TABLET ORAL
Status: COMPLETED | OUTPATIENT
Start: 2025-04-21 | End: 2025-04-21

## 2025-04-21 RX ORDER — FENTANYL CITRATE 50 UG/ML
50 INJECTION, SOLUTION INTRAMUSCULAR; INTRAVENOUS EVERY 5 MIN PRN
Status: DISCONTINUED | OUTPATIENT
Start: 2025-04-21 | End: 2025-04-22 | Stop reason: HOSPADM

## 2025-04-21 RX ORDER — DEXAMETHASONE SODIUM PHOSPHATE 10 MG/ML
4 INJECTION, SOLUTION INTRAMUSCULAR; INTRAVENOUS
Status: DISCONTINUED | OUTPATIENT
Start: 2025-04-21 | End: 2025-04-22 | Stop reason: HOSPADM

## 2025-04-21 RX ORDER — BALANCED SALT SOLUTION 6.4; .75; .48; .3; 3.9; 1.7 MG/ML; MG/ML; MG/ML; MG/ML; MG/ML; MG/ML
SOLUTION OPHTHALMIC PRN
Status: DISCONTINUED | OUTPATIENT
Start: 2025-04-21 | End: 2025-04-21 | Stop reason: HOSPADM

## 2025-04-21 RX ORDER — OXYCODONE HYDROCHLORIDE 5 MG/1
5 TABLET ORAL
Status: COMPLETED | OUTPATIENT
Start: 2025-04-21 | End: 2025-04-21

## 2025-04-21 RX ORDER — SODIUM CHLORIDE, SODIUM LACTATE, POTASSIUM CHLORIDE, CALCIUM CHLORIDE 600; 310; 30; 20 MG/100ML; MG/100ML; MG/100ML; MG/100ML
INJECTION, SOLUTION INTRAVENOUS CONTINUOUS
Status: DISCONTINUED | OUTPATIENT
Start: 2025-04-21 | End: 2025-04-22 | Stop reason: HOSPADM

## 2025-04-21 RX ORDER — OXYMETAZOLINE HYDROCHLORIDE 0.05 G/100ML
SPRAY NASAL PRN
Status: DISCONTINUED | OUTPATIENT
Start: 2025-04-21 | End: 2025-04-21 | Stop reason: HOSPADM

## 2025-04-21 RX ORDER — ONDANSETRON 2 MG/ML
4 INJECTION INTRAMUSCULAR; INTRAVENOUS EVERY 30 MIN PRN
Status: DISCONTINUED | OUTPATIENT
Start: 2025-04-21 | End: 2025-04-22 | Stop reason: HOSPADM

## 2025-04-21 RX ORDER — PREDNISOLONE ACETATE 10 MG/ML
SUSPENSION/ DROPS OPHTHALMIC
Qty: 10 ML | Refills: 0 | Status: SHIPPED | OUTPATIENT
Start: 2025-04-21

## 2025-04-21 RX ORDER — FENTANYL CITRATE 50 UG/ML
INJECTION, SOLUTION INTRAMUSCULAR; INTRAVENOUS PRN
Status: DISCONTINUED | OUTPATIENT
Start: 2025-04-21 | End: 2025-04-21

## 2025-04-21 RX ORDER — ACETAMINOPHEN 325 MG/1
975 TABLET ORAL ONCE
Status: COMPLETED | OUTPATIENT
Start: 2025-04-21 | End: 2025-04-21

## 2025-04-21 RX ORDER — ONDANSETRON 4 MG/1
4 TABLET, ORALLY DISINTEGRATING ORAL EVERY 30 MIN PRN
Status: DISCONTINUED | OUTPATIENT
Start: 2025-04-21 | End: 2025-04-22 | Stop reason: HOSPADM

## 2025-04-21 RX ADMIN — ACETAMINOPHEN 975 MG: 325 TABLET ORAL at 07:13

## 2025-04-21 RX ADMIN — OXYCODONE HYDROCHLORIDE 5 MG: 5 TABLET ORAL at 09:22

## 2025-04-21 RX ADMIN — FENTANYL CITRATE 25 MCG: 50 INJECTION, SOLUTION INTRAMUSCULAR; INTRAVENOUS at 08:40

## 2025-04-21 RX ADMIN — SODIUM CHLORIDE, SODIUM LACTATE, POTASSIUM CHLORIDE, CALCIUM CHLORIDE: 600; 310; 30; 20 INJECTION, SOLUTION INTRAVENOUS at 07:23

## 2025-04-21 RX ADMIN — FENTANYL CITRATE 50 MCG: 50 INJECTION, SOLUTION INTRAMUSCULAR; INTRAVENOUS at 08:24

## 2025-04-21 RX ADMIN — OXYCODONE HYDROCHLORIDE 5 MG: 5 TABLET ORAL at 10:16

## 2025-04-21 RX ADMIN — FENTANYL CITRATE 50 MCG: 50 INJECTION, SOLUTION INTRAMUSCULAR; INTRAVENOUS at 09:09

## 2025-04-21 RX ADMIN — DEXAMETHASONE SODIUM PHOSPHATE 4 MG: 4 INJECTION, SOLUTION INTRA-ARTICULAR; INTRALESIONAL; INTRAMUSCULAR; INTRAVENOUS; SOFT TISSUE at 08:29

## 2025-04-21 RX ADMIN — FENTANYL CITRATE 25 MCG: 50 INJECTION, SOLUTION INTRAMUSCULAR; INTRAVENOUS at 09:16

## 2025-04-21 RX ADMIN — GLYCOPYRROLATE 0.2 MG: 0.2 INJECTION, SOLUTION INTRAMUSCULAR; INTRAVENOUS at 08:39

## 2025-04-21 RX ADMIN — PROPOFOL 160 MG: 10 INJECTION, EMULSION INTRAVENOUS at 08:24

## 2025-04-21 RX ADMIN — PROPOFOL 150 MCG/KG/MIN: 10 INJECTION, EMULSION INTRAVENOUS at 08:24

## 2025-04-21 RX ADMIN — KETOROLAC TROMETHAMINE 15 MG: 30 INJECTION, SOLUTION INTRAMUSCULAR; INTRAVENOUS at 08:52

## 2025-04-21 RX ADMIN — ONDANSETRON 4 MG: 2 INJECTION INTRAMUSCULAR; INTRAVENOUS at 08:29

## 2025-04-21 RX ADMIN — LIDOCAINE HYDROCHLORIDE 100 MG: 20 INJECTION, SOLUTION INFILTRATION; PERINEURAL at 08:24

## 2025-04-21 NOTE — ANESTHESIA PREPROCEDURE EVALUATION
Anesthesia Pre-Procedure Evaluation    Patient: Vaishali Fuller   MRN: [id number] : 1965        Procedure : Procedure(s):  Eye muscle correction, left eye.          Past Medical History:   Diagnosis Date    Arthritis     Graves disease     HTN (hypertension)       Past Surgical History:   Procedure Laterality Date    HEEL SPUR SURGERY Right 2020    RECESSION RESECTION WITH ADJUSTABLE SUTURE BILATERAL Bilateral 2024    Procedure: Bilateral eye muscle correction. Use of adjustable suture in left eye.;  Surgeon: Murray Hendrickson MD;  [place] [facility] [place]      No Known Allergies   Social History     Tobacco Use    Smoking status: Never    Smokeless tobacco: Never   Substance Use Topics    Alcohol use: Not on file      Wt Readings from Last 1 Encounters:   25 65.8 kg (145 lb)        Anesthesia Evaluation   Pt has had prior anesthetic.     No history of anesthetic complications       ROS/MED HX  ENT/Pulmonary:  - neg pulmonary ROS     Neurologic: Comment: Ocular MG - neg neurologic ROS     Cardiovascular:  - neg cardiovascular ROS   (+)  hypertension- -   -  - -                                      METS/Exercise Tolerance: >4 METS    Hematologic:  - neg hematologic  ROS     Musculoskeletal:  - neg musculoskeletal ROS     GI/Hepatic:  - neg GI/hepatic ROS     Renal/Genitourinary:  - neg Renal ROS     Endo:  - neg endo ROS   (+)          thyroid problem, hypothyroidism,           Psychiatric/Substance Use:  - neg psychiatric ROS     Infectious Disease:  - neg infectious disease ROS     Malignancy:  - neg malignancy ROS     Other:  - neg other ROS          Physical Exam    Airway        Mallampati: II   TM distance: > 3 FB   Neck ROM: full   Mouth opening: > 3 cm    Respiratory Devices and Support         Dental       (+) Minor Abnormalities - some fillings, tiny chips      Cardiovascular   cardiovascular exam normal          Pulmonary   pulmonary exam normal                OUTSIDE LABS:  CBC: No  "results found for: \"WBC\", \"HGB\", \"HCT\", \"PLT\"  BMP: No results found for: \"NA\", \"POTASSIUM\", \"CHLORIDE\", \"CO2\", \"BUN\", \"CR\", \"GLC\"  COAGS: No results found for: \"PTT\", \"INR\", \"FIBR\"  POC: No results found for: \"BGM\", \"HCG\", \"HCGS\"  HEPATIC: No results found for: \"ALBUMIN\", \"PROTTOTAL\", \"ALT\", \"AST\", \"GGT\", \"ALKPHOS\", \"BILITOTAL\", \"BILIDIRECT\", \"SHAHNAZ\"  OTHER: No results found for: \"PH\", \"LACT\", \"A1C\", \"NAVEEN\", \"PHOS\", \"MAG\", \"LIPASE\", \"AMYLASE\", \"TSH\", \"T4\", \"T3\", \"CRP\", \"SED\"    Anesthesia Plan    ASA Status:  2    NPO Status:  NPO Appropriate    Anesthesia Type: General.     - Airway: LMA   Induction: Propofol.   Maintenance: TIVA.        Consents    Anesthesia Plan(s) and associated risks, benefits, and realistic alternatives discussed. Questions answered and patient/representative(s) expressed understanding.     - Discussed:     - Discussed with:  Patient            Postoperative Care    Pain management: IV analgesics, Oral pain medications, Multi-modal analgesia.   PONV prophylaxis: Ondansetron (or other 5HT-3), Background Propofol Infusion     Comments:    Other Comments: Discussed risks of general anesthesia, including aspiration pneumonia, sore throat/hoarse voice, abrasions/damage to lips/tongue/teeth, nausea, rare complications (including medication reactions, cardiac, pulmonary, hypoxia/low oxygen, recall). Ensured understanding, invited questions and all questions were answered. Patient wishes to proceed.               Shailesh Connell MD    Clinically Significant Risk Factors Present on Admission                   # Hypertension: Home medication list includes antihypertensive(s)           # Overweight: Estimated body mass index is 27.4 kg/m  as calculated from the following:    Height as of this encounter: 1.549 m (5' 1\").    Weight as of this encounter: 65.8 kg (145 lb).                "

## 2025-04-21 NOTE — ANESTHESIA PROCEDURE NOTES
Airway       Patient location during procedure: OR  Staff -        CRNA: Jayda Linda APRN CRNA       Performed By: CRNA  Consent for Airway        Urgency: elective  Indications and Patient Condition       Indications for airway management: zurdo-procedural       Induction type:intravenous       Mask difficulty assessment: 0 - not attempted    Final Airway Details       Final airway type: supraglottic airway    Supraglottic Airway Details        Type: LMA       Brand: LMA Unique       LMA size: 4    Post intubation assessment        Placement verified by: capnometry, equal breath sounds and chest rise        Number of attempts at approach: 1       Secured with: tape       Ease of procedure: easy       Dentition: Intact

## 2025-04-21 NOTE — ANESTHESIA POSTPROCEDURE EVALUATION
Patient: Vaishali Fuller    Procedure: Procedure(s):  Eye muscle correction, left eye.       Anesthesia Type:  General    Note:  Disposition: Outpatient   Postop Pain Control: Uneventful            Sign Out: Well controlled pain   PONV: No   Neuro/Psych: Uneventful            Sign Out: Acceptable/Baseline neuro status   Airway/Respiratory: Uneventful            Sign Out: Acceptable/Baseline resp. status   CV/Hemodynamics: Uneventful            Sign Out: Acceptable CV status; No obvious hypovolemia; No obvious fluid overload   Other NRE: NONE   DID A NON-ROUTINE EVENT OCCUR? No           Last vitals:  Vitals Value Taken Time   /72 04/21/25 0923   Temp 36.2  C (97.2  F) 04/21/25 0923   Pulse 70 04/21/25 0923   Resp 16 04/21/25 0923   SpO2 96 % 04/21/25 0923       Electronically Signed By: Shailesh Connell MD  April 21, 2025  10:07 AM

## 2025-04-21 NOTE — OP NOTE
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 She had a dramatic improvement in her strabismus with an initial strabismus surgery on 12/2/24 that included maximal bilateral medial rectus recessions using adjustable suture technique and a right inferior rectus recession of 2 mm. She had a residual moderate angle esotropia afterwards and is on the border of the amount of prism that she would tolerate in ground in prism glasses so decided she would like a second strabismus surgery to reduce her dependency on prisms.    I warned the patient about the risks, benefits, and alternatives of eye muscle surgery including a relatively small risk for severe infection, retinal detachment, and permanent vision loss of the eye.  I also discussed the possibility of postoperative double vision.  I discussed the possibility that due to postoperative double vision or a postoperative recurrent strabismus, the patient may require additional strabismus procedures in the future.  Understanding these risks, the patient decided to proceed with strabismus surgery.      DESCRIPTION OF PROCEDURE:    After the risks, benefits, and alternatives of eye muscle surgery were discussed with the patient and the patient's questions were answered both in clinic and on the day of surgery, written informed consent was obtained and witnessed in the preoperative holding area on the  "day of surgery.  The word \"yes\" was written over the left eye indicating that the patient consented to eye muscle correction in the left eye.  An intravenous line was placed and light intravenous sedation was given.  The patient was transported to the operating room where after appropriate monitors were placed, the patient underwent induction of general anesthesia without complication.      Both eyes were prepped and draped in the usual sterile fashion for ophthalmic surgery and a lid speculum was placed in the in the right eye.  Forced duction testing revealed mild restriction to abduction.  The lid speculum was then removed from the right eye (and no strabismus surgery was performed in the right eye).    A speculum was placed in the left eye. Forced duction testing in this eye revealed no restriction in the left eye.  A trapezoidal temporal conjunctival flap was created using conjunctival forceps and Pj scissors.  This was reflected backwards to expose the left lateral rectus muscle which was isolated using a Vitaliy muscle hook.  The muscle was freed from Tenon's capsule with blunt dissection using a Pj scissors and cotton swab.  A double armed 6-0 Vicryl suture was then woven across the width of the muscle at a point measured to be 6.0 mm posterior to the insertion and tied to the edges.  A hemostat straight clamp was then clamped perpendicular to the muscle just anterior to the suture and the distal 5.5 mm muscle segment was excised with a Pj scissors and 0.3 forceps.  Both arms of the 6-0 Vicryl suture were then passed partial thickness through the sclera in a crossing swords technique at the physiologic insertion site.  At this point, the ends of the suture were tied together tightly advancing the muscle and completing a resection effect of 6.0 mm.  The needles were cut off and the ends were cut short.  The conjunctival flap was then re-opposed in the surgical bed and held in place using two " 6-0 plain gut sutures.  The lid speculum was removed from the operative eye.     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  The patient was awakened from general anesthesia without complication and discharged to the recovery room in stable condition.       SPECIMENS REMOVED:  None.      ESTIMATED BLOOD LOSS:  1 mL or less.    INTRAOPERATIVE FLUIDS:  As per anesthesia records.      SPONGE/INSTRUMENT/NEEDLE COUNTS:  All sponge, instrument, and needle counts were correct times two.    CONDITION ON DISCHARGE FROM OPERATING ROOM:  Stable.      COMPLICATIONS:  None.     I performed the entire procedure myself as primary surgeon.

## 2025-04-21 NOTE — ANESTHESIA CARE TRANSFER NOTE
Patient: Vaishali Fuller    Procedure: Procedure(s):  Eye muscle correction, left eye.       Diagnosis: Diplopia [H53.2]  Diagnosis Additional Information: No value filed.    Anesthesia Type:   General     Note:    Oropharynx: spontaneously breathing  Level of Consciousness: awake  Oxygen Supplementation: room air    Independent Airway: airway patency satisfactory and stable  Dentition: dentition unchanged  Vital Signs Stable: post-procedure vital signs reviewed and stable  Report to RN Given: handoff report given  Patient transferred to: PACU    Handoff Report: Identifed the Patient, Identified the Reponsible Provider, Reviewed the pertinent medical history, Discussed the surgical course, Reviewed Intra-OP anesthesia mangement and issues during anesthesia, Set expectations for post-procedure period and Allowed opportunity for questions and acknowledgement of understanding  Vitals:  Vitals Value Taken Time   BP     Temp     Pulse 92 04/21/25 0858   Resp 27 04/21/25 0858   SpO2 95 % 04/21/25 0858   Vitals shown include unfiled device data.    Electronically Signed By: BARBARA Fink CRNA  April 21, 2025  8:59 AM

## 2025-04-21 NOTE — DISCHARGE INSTRUCTIONS
[facility]  Home Care Following Anesthesia  For 24 hours after surgery:  Get plenty of rest.  A responsible adult must stay with you for at least 24 hours after you leave the surgery center.  Do not drive or use heavy equipment.  If you have weakness or tingling, don't drive or use heavy equipment until this feeling goes away.   Do not drink alcohol.   Avoid strenuous or risky activities.  Ask for help when climbing stairs.  You may feel lightheaded.  IF so, sit for a few minutes before standing.  Have someone help you get up.   If you have nausea (feel sick to your stomach): Drink only clear liquids such as apple juice, ginger ale, broth or 7-Up.  Rest may also help.  Be sure to drink enough fluids.  Move to a regular diet as you feel able.   You may have a slight fever.  Call the doctor if your fever is over 100 F (37.7 C) (taken under the tongue) or lasts longer than 24 hours.  You may have a dry mouth, a sore throat, muscle aches or trouble sleeping. These should go away after 24 hours.  Do not make important or legal decisions.   It is recommended to avoid smoking.               Tips for taking pain medications  To get the best pain relief possible, remember these points:  Take pain medications as directed, before pain becomes severe.  Pain medication can upset your stomach: taking it with food may help.  Constipation is a common side effect of pain medication. Drink plenty of  fluids.  Eat foods high in fiber. Take a stool softener if recommended by your doctor or pharmacist.  Do not drink alcohol, drive or operate machinery while taking pain medications.  Ask about other ways to control pain, such as with heat, ice or relaxation.    Tylenol/Acetaminophen Consumption    If you feel your pain relief is insufficient, you may take Tylenol/Acetaminophen in addition to your narcotic pain medication.   Be careful not to exceed 4,000 mg of Tylenol/Acetaminophen in a 24 hour  period from all sources.  If you are taking extra strength Tylenol/acetaminophen (500 mg), the maximum dose is 8 tablets in 24 hours.  If you are taking regular strength acetaminophen (325 mg), the maximum dose is 12 tablets in 24 hours.  Tylenol 975 mg given at 7:13am.   Ok to take more after 1:13pm.    Toradol 15 mg given at  8:52am.  Do not take any NSAIDs for 4 hours.  OK after 12:52pm.  (Ibuprofen, Advil, Motrin, Naproxen, Aleve).      Call a doctor for any of the following:  Signs of infection (fever, growing tenderness at the surgery site, a large amount of drainage or bleeding, severe pain, foul-smelling drainage, redness, swelling).  It has been over 8 to 10 hours since surgery and you are still not able to urinate (pass water).  Headache for over 24 hours.  Numbness, tingling or weakness the day after surgery (if you had spinal anesthesia).  Signs of Covid-19 infection (temperature over 100 degrees, shortness of breath, cough, loss of taste/smell, generalized body aches, persistent headache, chills, sore throat, nausea/vomiting/diarrhea)    Your doctor is:       Dr. Murray Hendrickson, Ophthalmology: [phone]               After hours and weekends call the hospital @ [phone] and ask for the resident on call for:  Ophthalmology  For emergency care, call the:  [facility]:  [phone] (TTY for hearing impaired: [phone])

## 2025-04-21 NOTE — BRIEF OP NOTE
[facility] [place]    Brief Operative Note     Pre-operative diagnosis: Strabismus [H50.9]  Post-operative diagnosis Same as pre-operative diagnosis    Procedure(s):  Eye muscle correction, left eye.    Surgeons and Role:     * Murray Hendrickson MD - Primary    Anesthesia: General   Estimated Blood Loss: Less than 1 ml    Drains:   None  Specimens:  * No specimens in log *  Findings:   See full operative report.  Complications:             None.  Implants:  * No implants in log *

## 2025-05-07 ENCOUNTER — OFFICE VISIT (OUTPATIENT)
Dept: OPHTHALMOLOGY | Facility: CLINIC | Age: 60
End: 2025-05-07
Attending: OPHTHALMOLOGY
Payer: COMMERCIAL

## 2025-05-07 DIAGNOSIS — H53.2 DOUBLE VISION: Primary | ICD-10-CM

## 2025-05-07 PROCEDURE — 99213 OFFICE O/P EST LOW 20 MIN: CPT | Performed by: OPHTHALMOLOGY

## 2025-05-07 PROCEDURE — 99024 POSTOP FOLLOW-UP VISIT: CPT | Performed by: OPHTHALMOLOGY

## 2025-05-07 ASSESSMENT — SLIT LAMP EXAM - LIDS: COMMENTS: NO LAG

## 2025-05-07 ASSESSMENT — VISUAL ACUITY
OS_SC: 20/25
METHOD: SNELLEN - LINEAR
OD_SC+: +2
OD_SC: 20/25

## 2025-05-07 ASSESSMENT — TONOMETRY
OD_IOP_MMHG: 10
IOP_METHOD: ICARE
OS_IOP_MMHG: 12

## 2025-05-07 ASSESSMENT — EXTERNAL EXAM - LEFT EYE: OS_EXAM: NORMAL

## 2025-05-07 ASSESSMENT — EXTERNAL EXAM - RIGHT EYE: OD_EXAM: NORMAL

## 2025-05-07 NOTE — PATIENT INSTRUCTIONS
Start using Prednisolone acetate (pink top) drops: four times per day for one week, three times per day for one week, two times per day for one week, once per day for one week, and then stop  Follow up at the next available [facility] clinic appointment (9/2/25)  
38

## 2025-05-07 NOTE — PROGRESS NOTES
1. Status post strabismus surgery:     -Surgery: 4/21/25    PREOPERATIVE DIAGNOSIS (ES):  Thyroid eye disease   Restrictive esotropia   Concern for Myasthenia gravis     POSTOPERATIVE DIAGNOSIS (ES):  Same     NAME OF OPERATION:  Left lateral rectus resection 6.0 mm    - Alignment: Dsc X4 Nsc X'2  - Diplopia: none(!)  - Healing up appropriately  - Maxitrol ophthalmic ointment: stop  - Start Predforte 1% four times a day in the operative eye(s) and decrease by one drop daily every week.  Course will complete in 1 month.    Follow up in THYROID EYE DISEASE clinic on 9/2/25 for final postop check and evaluation by C[facility] for left upper eyelid surgery     Complete documentation of historical and exam elements from today's encounter can be found in the full encounter summary report (not reduplicated in this progress note).  I personally obtained the chief complaint(s) and history of present illness.  I confirmed and edited as necessary the review of systems, past medical/surgical history, family history, social history, and examination findings as documented by others; and I examined the patient myself.  I personally reviewed the relevant tests, images, and reports as documented above.  I formulated and edited as necessary the assessment and plan and discussed the findings and management plan with the patient and family   MD Ken Samuel MD  PGY-3, Ophthalmology  [facility]

## 2025-07-05 ENCOUNTER — HEALTH MAINTENANCE LETTER (OUTPATIENT)
Age: 60
End: 2025-07-05

## 2025-09-02 ENCOUNTER — OFFICE VISIT (OUTPATIENT)
Dept: OPHTHALMOLOGY | Facility: CLINIC | Age: 60
End: 2025-09-02
Attending: OPHTHALMOLOGY
Payer: COMMERCIAL

## 2025-09-02 DIAGNOSIS — E07.9 THYROID EYE DISEASE: Primary | ICD-10-CM

## 2025-09-02 DIAGNOSIS — H57.89 THYROID EYE DISEASE: Primary | ICD-10-CM

## 2025-09-02 PROCEDURE — 92285 EXTERNAL OCULAR PHOTOGRAPHY: CPT | Performed by: OPHTHALMOLOGY

## 2025-09-02 PROCEDURE — 92285 EXTERNAL OCULAR PHOTOGRAPHY: CPT | Mod: 26 | Performed by: OPHTHALMOLOGY

## 2025-09-02 PROCEDURE — 99213 OFFICE O/P EST LOW 20 MIN: CPT | Performed by: OPHTHALMOLOGY

## 2025-09-02 PROCEDURE — 92060 SENSORIMOTOR EXAMINATION: CPT | Mod: 26 | Performed by: OPHTHALMOLOGY

## 2025-09-02 PROCEDURE — 92012 INTRM OPH EXAM EST PATIENT: CPT | Mod: GC | Performed by: OPHTHALMOLOGY

## 2025-09-02 PROCEDURE — 92060 SENSORIMOTOR EXAMINATION: CPT | Performed by: OPHTHALMOLOGY

## 2025-09-02 ASSESSMENT — CONF VISUAL FIELD
OD_INFERIOR_NASAL_RESTRICTION: 0
OD_SUPERIOR_TEMPORAL_RESTRICTION: 0
OS_SUPERIOR_NASAL_RESTRICTION: 0
OD_NORMAL: 1
OD_INFERIOR_TEMPORAL_RESTRICTION: 0
METHOD: COUNTING FINGERS
OD_SUPERIOR_NASAL_RESTRICTION: 0
OS_INFERIOR_NASAL_RESTRICTION: 0
OS_SUPERIOR_TEMPORAL_RESTRICTION: 0
OS_INFERIOR_TEMPORAL_RESTRICTION: 0
OS_NORMAL: 1

## 2025-09-02 ASSESSMENT — VISUAL ACUITY
OD_SC: 20/30
OD_SC+: +2
METHOD: SNELLEN - LINEAR
OS_SC+: -1
OS_SC: 20/25

## 2025-09-02 ASSESSMENT — EXTERNAL EXAM - RIGHT EYE: OD_EXAM: NORMAL

## 2025-09-02 ASSESSMENT — LAGOPHTHALMOS
OD_LAGOPHTHALMOS: 0
OS_LAGOPHTHALMOS: 3

## 2025-09-02 ASSESSMENT — SLIT LAMP EXAM - LIDS: COMMENTS: NO LAG

## 2025-09-02 ASSESSMENT — TONOMETRY
IOP_METHOD: ICARE
OS_IOP_MMHG: 11
OD_IOP_MMHG: 11

## 2025-09-02 ASSESSMENT — EXTERNAL EXAM - LEFT EYE: OS_EXAM: NORMAL

## 2025-09-02 ASSESSMENT — MARGIN REFLEX DISTANCE
OD_MRD1: 3
OS_MRD1: 8

## 2025-09-02 ASSESSMENT — LEVATOR FUNCTION
OD_LEVATOR: 7
OS_LEVATOR: 7

## (undated) DEVICE — SU PLAIN 6-0 G-1DA 18" 770G

## (undated) DEVICE — SOL WATER IRRIG 500ML BOTTLE 2F7113

## (undated) DEVICE — SU VICRYL 6-0 S-14DA 18" UND J670G

## (undated) DEVICE — EYE PREP BETADINE 5% SOLUTION 30ML 0065-0411-30

## (undated) DEVICE — ESU HOLSTER PLASTIC DISP E2400

## (undated) DEVICE — EYE MARKING PAD 581057

## (undated) DEVICE — DRAPE STERI TOWEL LG 1010

## (undated) DEVICE — PACK MINOR EYE CUSTOM ASC

## (undated) DEVICE — ESU CORD BIPOLAR GREEN 10-4000

## (undated) DEVICE — GLOVE BIOGEL PI MICRO SZ 7.5 48575

## (undated) DEVICE — DRSG GAUZE 4X4" 3033

## (undated) DEVICE — PACK MINOR EYE CUSTOM ASC SEY15MEUMH

## (undated) DEVICE — SU VICRYL 6-0 S-29 12" J556G

## (undated) DEVICE — LINEN TOWEL PACK X5 5464

## (undated) RX ORDER — DEXAMETHASONE SODIUM PHOSPHATE 4 MG/ML
INJECTION, SOLUTION INTRA-ARTICULAR; INTRALESIONAL; INTRAMUSCULAR; INTRAVENOUS; SOFT TISSUE
Status: DISPENSED
Start: 2024-12-02

## (undated) RX ORDER — GLYCOPYRROLATE 0.2 MG/ML
INJECTION, SOLUTION INTRAMUSCULAR; INTRAVENOUS
Status: DISPENSED
Start: 2024-12-02

## (undated) RX ORDER — PROPOFOL 10 MG/ML
INJECTION, EMULSION INTRAVENOUS
Status: DISPENSED
Start: 2024-12-02

## (undated) RX ORDER — ONDANSETRON 2 MG/ML
INJECTION INTRAMUSCULAR; INTRAVENOUS
Status: DISPENSED
Start: 2024-12-02

## (undated) RX ORDER — FENTANYL CITRATE 50 UG/ML
INJECTION, SOLUTION INTRAMUSCULAR; INTRAVENOUS
Status: DISPENSED
Start: 2024-12-02

## (undated) RX ORDER — PROPOFOL 10 MG/ML
INJECTION, EMULSION INTRAVENOUS
Status: DISPENSED
Start: 2025-04-21

## (undated) RX ORDER — GLYCOPYRROLATE 0.2 MG/ML
INJECTION INTRAMUSCULAR; INTRAVENOUS
Status: DISPENSED
Start: 2024-12-02

## (undated) RX ORDER — OXYCODONE HYDROCHLORIDE 5 MG/1
TABLET ORAL
Status: DISPENSED
Start: 2025-04-21

## (undated) RX ORDER — TETRACAINE HYDROCHLORIDE 5 MG/ML
SOLUTION OPHTHALMIC
Status: DISPENSED
Start: 2024-12-02

## (undated) RX ORDER — KETOROLAC TROMETHAMINE 30 MG/ML
INJECTION, SOLUTION INTRAMUSCULAR; INTRAVENOUS
Status: DISPENSED
Start: 2024-12-02

## (undated) RX ORDER — ACETAMINOPHEN 325 MG/1
TABLET ORAL
Status: DISPENSED
Start: 2024-12-02

## (undated) RX ORDER — FENTANYL CITRATE 50 UG/ML
INJECTION, SOLUTION INTRAMUSCULAR; INTRAVENOUS
Status: DISPENSED
Start: 2025-04-21

## (undated) RX ORDER — OXYMETAZOLINE HYDROCHLORIDE 0.05 G/100ML
SPRAY NASAL
Status: DISPENSED
Start: 2024-12-02